# Patient Record
Sex: MALE | Race: WHITE | NOT HISPANIC OR LATINO | URBAN - METROPOLITAN AREA
[De-identification: names, ages, dates, MRNs, and addresses within clinical notes are randomized per-mention and may not be internally consistent; named-entity substitution may affect disease eponyms.]

---

## 2023-08-15 LAB
ALANINE AMINOTRANSFERASE (SGPT) (U/L) IN SER/PLAS: 18 U/L (ref 10–52)
ALBUMIN (G/DL) IN SER/PLAS: 4.3 G/DL (ref 3.4–5)
ALKALINE PHOSPHATASE (U/L) IN SER/PLAS: 37 U/L (ref 33–136)
ANION GAP IN SER/PLAS: 12 MMOL/L (ref 10–20)
ASPARTATE AMINOTRANSFERASE (SGOT) (U/L) IN SER/PLAS: 18 U/L (ref 9–39)
BASOPHILS (10*3/UL) IN BLOOD BY AUTOMATED COUNT: 0.07 X10E9/L (ref 0–0.1)
BASOPHILS/100 LEUKOCYTES IN BLOOD BY AUTOMATED COUNT: 1.2 % (ref 0–2)
BILIRUBIN TOTAL (MG/DL) IN SER/PLAS: 0.3 MG/DL (ref 0–1.2)
CALCIDIOL (25 OH VITAMIN D3) (NG/ML) IN SER/PLAS: 39 NG/ML
CALCIUM (MG/DL) IN SER/PLAS: 9.2 MG/DL (ref 8.6–10.3)
CARBON DIOXIDE, TOTAL (MMOL/L) IN SER/PLAS: 28 MMOL/L (ref 21–32)
CHLORIDE (MMOL/L) IN SER/PLAS: 99 MMOL/L (ref 98–107)
CHOLESTEROL (MG/DL) IN SER/PLAS: 148 MG/DL (ref 0–199)
CHOLESTEROL IN HDL (MG/DL) IN SER/PLAS: 34.8 MG/DL
CHOLESTEROL/HDL RATIO: 4.3
CREATININE (MG/DL) IN SER/PLAS: 0.79 MG/DL (ref 0.5–1.3)
EOSINOPHILS (10*3/UL) IN BLOOD BY AUTOMATED COUNT: 0.11 X10E9/L (ref 0–0.7)
EOSINOPHILS/100 LEUKOCYTES IN BLOOD BY AUTOMATED COUNT: 1.9 % (ref 0–6)
ERYTHROCYTE DISTRIBUTION WIDTH (RATIO) BY AUTOMATED COUNT: 12.8 % (ref 11.5–14.5)
ERYTHROCYTE MEAN CORPUSCULAR HEMOGLOBIN CONCENTRATION (G/DL) BY AUTOMATED: 33.3 G/DL (ref 32–36)
ERYTHROCYTE MEAN CORPUSCULAR VOLUME (FL) BY AUTOMATED COUNT: 90 FL (ref 80–100)
ERYTHROCYTES (10*6/UL) IN BLOOD BY AUTOMATED COUNT: 5.17 X10E12/L (ref 4.5–5.9)
ESTIMATED AVERAGE GLUCOSE FOR HBA1C: 120 MG/DL
GFR MALE: >90 ML/MIN/1.73M2
GLUCOSE (MG/DL) IN SER/PLAS: 89 MG/DL (ref 74–99)
HEMATOCRIT (%) IN BLOOD BY AUTOMATED COUNT: 46.5 % (ref 41–52)
HEMOGLOBIN (G/DL) IN BLOOD: 15.5 G/DL (ref 13.5–17.5)
HEMOGLOBIN A1C/HEMOGLOBIN TOTAL IN BLOOD: 5.8 %
IMMATURE GRANULOCYTES/100 LEUKOCYTES IN BLOOD BY AUTOMATED COUNT: 0.2 % (ref 0–0.9)
LDL: 102 MG/DL (ref 0–99)
LEUKOCYTES (10*3/UL) IN BLOOD BY AUTOMATED COUNT: 5.9 X10E9/L (ref 4.4–11.3)
LYMPHOCYTES (10*3/UL) IN BLOOD BY AUTOMATED COUNT: 1.92 X10E9/L (ref 1.2–4.8)
LYMPHOCYTES/100 LEUKOCYTES IN BLOOD BY AUTOMATED COUNT: 32.3 % (ref 13–44)
MONOCYTES (10*3/UL) IN BLOOD BY AUTOMATED COUNT: 0.73 X10E9/L (ref 0.1–1)
MONOCYTES/100 LEUKOCYTES IN BLOOD BY AUTOMATED COUNT: 12.3 % (ref 2–10)
NEUTROPHILS (10*3/UL) IN BLOOD BY AUTOMATED COUNT: 3.1 X10E9/L (ref 1.2–7.7)
NEUTROPHILS/100 LEUKOCYTES IN BLOOD BY AUTOMATED COUNT: 52.1 % (ref 40–80)
PLATELETS (10*3/UL) IN BLOOD AUTOMATED COUNT: 217 X10E9/L (ref 150–450)
POTASSIUM (MMOL/L) IN SER/PLAS: 3.9 MMOL/L (ref 3.5–5.3)
PROSTATE SPECIFIC ANTIGEN,SCREEN: 0.95 NG/ML (ref 0–4)
PROTEIN TOTAL: 6.8 G/DL (ref 6.4–8.2)
SODIUM (MMOL/L) IN SER/PLAS: 135 MMOL/L (ref 136–145)
THYROTROPIN (MIU/L) IN SER/PLAS BY DETECTION LIMIT <= 0.05 MIU/L: 1.53 MIU/L (ref 0.44–3.98)
TRIGLYCERIDE (MG/DL) IN SER/PLAS: 54 MG/DL (ref 0–149)
UREA NITROGEN (MG/DL) IN SER/PLAS: 23 MG/DL (ref 6–23)
VLDL: 11 MG/DL (ref 0–40)

## 2024-02-28 ENCOUNTER — LAB (OUTPATIENT)
Dept: LAB | Facility: LAB | Age: 69
End: 2024-02-28
Payer: MEDICARE

## 2024-02-28 DIAGNOSIS — I10 ESSENTIAL (PRIMARY) HYPERTENSION: Primary | ICD-10-CM

## 2024-02-28 DIAGNOSIS — E55.9 VITAMIN D DEFICIENCY, UNSPECIFIED: ICD-10-CM

## 2024-02-28 DIAGNOSIS — R73.03 PREDIABETES: ICD-10-CM

## 2024-02-28 DIAGNOSIS — E78.5 HYPERLIPIDEMIA, UNSPECIFIED: ICD-10-CM

## 2024-02-28 LAB
25(OH)D3 SERPL-MCNC: 36 NG/ML (ref 30–100)
ALBUMIN SERPL BCP-MCNC: 4.5 G/DL (ref 3.4–5)
ALP SERPL-CCNC: 45 U/L (ref 33–136)
ALT SERPL W P-5'-P-CCNC: 18 U/L (ref 10–52)
ANION GAP SERPL CALC-SCNC: 11 MMOL/L (ref 10–20)
AST SERPL W P-5'-P-CCNC: 19 U/L (ref 9–39)
BASOPHILS # BLD AUTO: 0.06 X10*3/UL (ref 0–0.1)
BASOPHILS NFR BLD AUTO: 1.3 %
BILIRUB SERPL-MCNC: 0.5 MG/DL (ref 0–1.2)
BUN SERPL-MCNC: 16 MG/DL (ref 6–23)
CALCIUM SERPL-MCNC: 9.7 MG/DL (ref 8.6–10.3)
CHLORIDE SERPL-SCNC: 100 MMOL/L (ref 98–107)
CHOLEST SERPL-MCNC: 157 MG/DL (ref 0–199)
CHOLESTEROL/HDL RATIO: 4
CO2 SERPL-SCNC: 29 MMOL/L (ref 21–32)
CREAT SERPL-MCNC: 0.75 MG/DL (ref 0.5–1.3)
EGFRCR SERPLBLD CKD-EPI 2021: >90 ML/MIN/1.73M*2
EOSINOPHIL # BLD AUTO: 0.11 X10*3/UL (ref 0–0.7)
EOSINOPHIL NFR BLD AUTO: 2.3 %
ERYTHROCYTE [DISTWIDTH] IN BLOOD BY AUTOMATED COUNT: 12.7 % (ref 11.5–14.5)
EST. AVERAGE GLUCOSE BLD GHB EST-MCNC: 114 MG/DL
GLUCOSE SERPL-MCNC: 98 MG/DL (ref 74–99)
HBA1C MFR BLD: 5.6 %
HCT VFR BLD AUTO: 48.1 % (ref 41–52)
HDLC SERPL-MCNC: 39.3 MG/DL
HGB BLD-MCNC: 16.3 G/DL (ref 13.5–17.5)
IMM GRANULOCYTES # BLD AUTO: 0.01 X10*3/UL (ref 0–0.7)
IMM GRANULOCYTES NFR BLD AUTO: 0.2 % (ref 0–0.9)
LDLC SERPL CALC-MCNC: 102 MG/DL
LYMPHOCYTES # BLD AUTO: 1.75 X10*3/UL (ref 1.2–4.8)
LYMPHOCYTES NFR BLD AUTO: 37.2 %
MCH RBC QN AUTO: 30.2 PG (ref 26–34)
MCHC RBC AUTO-ENTMCNC: 33.9 G/DL (ref 32–36)
MCV RBC AUTO: 89 FL (ref 80–100)
MONOCYTES # BLD AUTO: 0.46 X10*3/UL (ref 0.1–1)
MONOCYTES NFR BLD AUTO: 9.8 %
NEUTROPHILS # BLD AUTO: 2.31 X10*3/UL (ref 1.2–7.7)
NEUTROPHILS NFR BLD AUTO: 49.2 %
NON HDL CHOLESTEROL: 118 MG/DL (ref 0–149)
NRBC BLD-RTO: 0 /100 WBCS (ref 0–0)
PLATELET # BLD AUTO: 231 X10*3/UL (ref 150–450)
POTASSIUM SERPL-SCNC: 4.9 MMOL/L (ref 3.5–5.3)
PROT SERPL-MCNC: 6.9 G/DL (ref 6.4–8.2)
RBC # BLD AUTO: 5.4 X10*6/UL (ref 4.5–5.9)
SODIUM SERPL-SCNC: 135 MMOL/L (ref 136–145)
TRIGL SERPL-MCNC: 81 MG/DL (ref 0–149)
TSH SERPL-ACNC: 1.24 MIU/L (ref 0.44–3.98)
VLDL: 16 MG/DL (ref 0–40)
WBC # BLD AUTO: 4.7 X10*3/UL (ref 4.4–11.3)

## 2024-02-28 PROCEDURE — 80061 LIPID PANEL: CPT

## 2024-02-28 PROCEDURE — 84443 ASSAY THYROID STIM HORMONE: CPT

## 2024-02-28 PROCEDURE — 83036 HEMOGLOBIN GLYCOSYLATED A1C: CPT

## 2024-02-28 PROCEDURE — 85025 COMPLETE CBC W/AUTO DIFF WBC: CPT

## 2024-02-28 PROCEDURE — 80053 COMPREHEN METABOLIC PANEL: CPT

## 2024-02-28 PROCEDURE — 82306 VITAMIN D 25 HYDROXY: CPT

## 2024-02-28 PROCEDURE — 36415 COLL VENOUS BLD VENIPUNCTURE: CPT

## 2024-07-30 ENCOUNTER — HOSPITAL ENCOUNTER (OUTPATIENT)
Dept: RADIOLOGY | Facility: HOSPITAL | Age: 69
Discharge: HOME | End: 2024-07-30
Payer: MEDICARE

## 2024-07-30 ENCOUNTER — APPOINTMENT (OUTPATIENT)
Dept: ORTHOPEDIC SURGERY | Facility: CLINIC | Age: 69
End: 2024-07-30
Payer: MEDICARE

## 2024-07-30 VITALS — WEIGHT: 231 LBS | BODY MASS INDEX: 31.29 KG/M2 | HEIGHT: 72 IN

## 2024-07-30 DIAGNOSIS — M25.562 ACUTE BILATERAL KNEE PAIN: ICD-10-CM

## 2024-07-30 DIAGNOSIS — M25.562 ACUTE BILATERAL KNEE PAIN: Primary | ICD-10-CM

## 2024-07-30 DIAGNOSIS — M25.561 ACUTE BILATERAL KNEE PAIN: Primary | ICD-10-CM

## 2024-07-30 DIAGNOSIS — M25.561 ACUTE BILATERAL KNEE PAIN: ICD-10-CM

## 2024-07-30 PROCEDURE — 1159F MED LIST DOCD IN RCRD: CPT | Performed by: ORTHOPAEDIC SURGERY

## 2024-07-30 PROCEDURE — 73560 X-RAY EXAM OF KNEE 1 OR 2: CPT | Mod: LT

## 2024-07-30 PROCEDURE — 1125F AMNT PAIN NOTED PAIN PRSNT: CPT | Performed by: ORTHOPAEDIC SURGERY

## 2024-07-30 PROCEDURE — 1160F RVW MEDS BY RX/DR IN RCRD: CPT | Performed by: ORTHOPAEDIC SURGERY

## 2024-07-30 PROCEDURE — 99205 OFFICE O/P NEW HI 60 MIN: CPT | Performed by: ORTHOPAEDIC SURGERY

## 2024-07-30 PROCEDURE — 73562 X-RAY EXAM OF KNEE 3: CPT | Mod: RT

## 2024-07-30 PROCEDURE — 3008F BODY MASS INDEX DOCD: CPT | Performed by: ORTHOPAEDIC SURGERY

## 2024-07-30 RX ORDER — MELOXICAM 15 MG/1
15 TABLET ORAL DAILY
Qty: 30 TABLET | Refills: 3 | Status: SHIPPED | OUTPATIENT
Start: 2024-07-30 | End: 2024-11-27

## 2024-07-30 RX ORDER — CEFAZOLIN SODIUM 2 G/100ML
2 INJECTION, SOLUTION INTRAVENOUS ONCE
OUTPATIENT
Start: 2024-07-30 | End: 2024-07-30

## 2024-07-30 RX ORDER — OLMESARTAN MEDOXOMIL 20 MG/1
20 TABLET ORAL DAILY
COMMUNITY

## 2024-07-30 RX ORDER — ACETAMINOPHEN 325 MG/1
975 TABLET ORAL ONCE
OUTPATIENT
Start: 2024-07-30 | End: 2024-07-30

## 2024-07-30 RX ORDER — MELOXICAM 15 MG/1
15 TABLET ORAL DAILY
COMMUNITY
End: 2024-07-30 | Stop reason: SDUPTHER

## 2024-07-30 RX ORDER — ATORVASTATIN CALCIUM 20 MG/1
20 TABLET, FILM COATED ORAL DAILY
COMMUNITY

## 2024-07-30 RX ORDER — SODIUM CHLORIDE 9 MG/ML
100 INJECTION, SOLUTION INTRAVENOUS CONTINUOUS
OUTPATIENT
Start: 2024-07-30

## 2024-07-30 RX ORDER — CELECOXIB 400 MG/1
400 CAPSULE ORAL ONCE
OUTPATIENT
Start: 2024-07-30 | End: 2024-07-30

## 2024-07-30 ASSESSMENT — PAIN SCALES - GENERAL: PAINLEVEL_OUTOF10: 6

## 2024-07-30 ASSESSMENT — PAIN - FUNCTIONAL ASSESSMENT: PAIN_FUNCTIONAL_ASSESSMENT: 0-10

## 2024-07-30 NOTE — PROGRESS NOTES
This is a consultation from Dr. Jena Mireles, APRN-CNP for   Chief Complaint   Patient presents with    Left Knee - Pain    Right Knee - Pain       This is a 68 y.o. male who presents for left knee pain.  Patient states has had left knee pain for many years, this is a chronic longstanding issue.  He has had 2 arthroscopic surgeries on it.  His pains been getting worse over time.  He had gel injections in the past which have been helpful but have worn off.  He has been severely exacerbated last few weeks, sharp pain over the medial knee worse with walking proving with rest.  Getting much worse and making it more difficult for him to get around.  He had extensive trial of nonsurgical management including use of anti-inflammatories physical therapy activity modification use of assistive devices as well as injections.  Despite that he has severe and worsening pain which impacts his quality of life and activities of daily living like to consider total knee    Physical Exam    There has been no interval change in this patient's past medical, surgical, medications, allergies, family history or social history since the most recent visit to a provider within our department. 14 point review of systems was performed, reviewed, and negative except for pertinent positives documented in the history of present illness.     Constitutional: well developed, well nourished male in no acute distress  Psychiatric: normal mood, appropriate affect  Eyes: sclera anicteric  HENT: normocephalic/atraumatic  CV: regular rate and rhythm   Respiratory: non labored breathing  Integumentary: no rash  Neurological: moves all extremities    Left knee exam: skin intact no lacerations or abrations.  1+ effusion.  Tender medial joint line. negative log roll negative patellar grind. ROM 5-100. stable to varus and valgus stress at 0 and 30 degrees. negative lachman negative posterior drawer negative monisha. 5/5 ehl/fhl/gs/ta. silt s/s/sp/dp/t. 2+  dp/pt        Xrays were ordered by me, they were reviewed and independently interpreted by me today, they show severe degenerative disease bone-on-bone arthritis left knee subchondral sclerosis osteophyte formation Kellgren-Antoine grade 4    Procedures      Impression/Plan: This is a 68 y.o. male with severe left knee arthritis that has failed nonoperative management.  Patient elected proceed with left total knee.    I had an extensive discussion with the patient regarding her condition and possible treatment options. Nonsurgical treatment for left knee arthritis includes activity modification, weight loss, use of a cane or other assistive device, pain medications and nonsteroidal anti-inflammatory medications, joint injections, and physical therapy. The patient has attempted non-surgical treatment for this condition for greater than 6 months and it has failed. We discussed the risks benefits and alternatives of total knee arthroplasty. Benefits of joint replacement include: Relief of pain, improvement of function, and improved quality of life. Alternatives include observation and watchful waiting, and the nonsurgical modalities noted above.     Discussed with the patient that during total knee arthroplasty prosthetic resurfacing of the patella may or may not be performed at the discretion of thesurgeon during surgery. In the event that prosthetic patellar resurfacing is not performed, nonprosthetic arthroplasty will be performed with removal of bone spurs, circumferential synovectomy and electrocautery. Patient was informed that anterior knee pain and patellofemoral crepitus can potentially occur after knee surgery with or without prosthetic patellar resurfacing.         We discussed the risks of complications as well as the risks of morbidity and mortality related to surgical treatment with total joint replacement. We reviewed the fact that total joint replacement is major elective surgery with significant  associated surgical and procedural risk factors as detailed below.   Risks include: Pain, blood loss, damage to nearby anatomical structures including but not limited to nerves or blood vessels muscles tendons and bone, failure surgery to ameliorate symptoms, persistence of pain surrounding the affected joint, mechanical failure of the prosthesis including but not limited to loosening of the prosthesis from bone ,breakage of the prosthesis and dislocation of the prosthesis, change in length or appearance of a limb, infection possibly necessitating further surgery, removal of the prosthesis, or limb amputation, the need for additional surgery for other reasons, blood clots, amputation, and death. No guarantees were implied or given.  All questions were answered and the patient voiced their understanding.     A complete set of surgical instructions was given to the patient. The patient was educated regarding preoperative nutrition, preparation of their home for postoperative rehabilitation, choosing a care partner, medical and dental clearance, the cessation of medications that can cause bleeding or presenting to risk for infection, chlorhexidine bath, nasal swab and decontamination, post operative follow up, and need for medical equipment. Patient was also educated regarding the possibility of same day surgery and related criteria and protocols. The patient will attend our joint class further education, and thereafter they will return for a preoperative visit. A presurgery education booklet was also given to the patient.     surgical plan: Left total knee  implants: DePuy  approach: Standard  DVT ppx aspirin  drugs to stop none  allergy to abx none  post operative abx: ancef +/- vanc (per protocol)  special clearance needed none    BMI Readings from Last 1 Encounters:   07/30/24 31.33 kg/m²      Lab Results   Component Value Date    CREATININE 0.75 02/28/2024     Tobacco Use: High Risk (7/30/2024)    Patient History  "    Smoking Tobacco Use: Every Day     Smokeless Tobacco Use: Unknown     Passive Exposure: Not on file      Computed MELD 3.0 unavailable. One or more values for this score either were not found within the given timeframe or did not fit some other criterion.  Computed MELD-Na unavailable. One or more values for this score either were not found within the given timeframe or did not fit some other criterion.       Lab Results   Component Value Date    HGBA1C 5.6 02/28/2024     No results found for: \"STAPHMRSASCR\"  "

## 2024-07-30 NOTE — LETTER
July 30, 2024     Jena Mireles, JOSEFA-CNP  [address]  [address]  [facility] [address]    Patient: Marcio Bower   YOB: 1955   Date of Visit: 7/30/2024       Dear Dr. Jena Mireles, APRN-CNP:    Thank you for referring Marcio Bower to me for evaluation. Below are my notes for this consultation.  If you have questions, please do not hesitate to call me. I look forward to following your patient along with you.       Sincerely,     Desmond Winter MD      CC: No Recipients  ______________________________________________________________________________________    This is a consultation from JOSEFA Sanchez-CNP for   Chief Complaint   Patient presents with   • Left Knee - Pain   • Right Knee - Pain       This is a 68 y.o. male who presents for left knee pain.  Patient states has had left knee pain for many years, this is a chronic longstanding issue.  He has had 2 arthroscopic surgeries on it.  His pains been getting worse over time.  He had gel injections in the past which have been helpful but have worn off.  He has been severely exacerbated last few weeks, sharp pain over the medial knee worse with walking proving with rest.  Getting much worse and making it more difficult for him to get around.  He had extensive trial of nonsurgical management including use of anti-inflammatories physical therapy activity modification use of assistive devices as well as injections.  Despite that he has severe and worsening pain which impacts his quality of life and activities of daily living like to consider total knee    Physical Exam    There has been no interval change in this patient's past medical, surgical, medications, allergies, family history or social history since the most recent visit to a provider within our department. 14 point review of systems was performed, reviewed, and negative except for pertinent positives documented in the history of present illness.     Constitutional:  well developed, well nourished male in no acute distress  Psychiatric: normal mood, appropriate affect  Eyes: sclera anicteric  HENT: normocephalic/atraumatic  CV: regular rate and rhythm   Respiratory: non labored breathing  Integumentary: no rash  Neurological: moves all extremities    Left knee exam: skin intact no lacerations or abrations.  1+ effusion.  Tender medial joint line. negative log roll negative patellar grind. ROM 5-100. stable to varus and valgus stress at 0 and 30 degrees. negative lachman negative posterior drawer negative monisha. 5/5 ehl/fhl/gs/ta. silt s/s/sp/dp/t. 2+ dp/pt        Xrays were ordered by me, they were reviewed and independently interpreted by me today, they show severe degenerative disease bone-on-bone arthritis left knee subchondral sclerosis osteophyte formation Kellgren-Antoine grade 4    Procedures      Impression/Plan: This is a 68 y.o. male with severe left knee arthritis that has failed nonoperative management.  Patient elected proceed with left total knee.    I had an extensive discussion with the patient regarding her condition and possible treatment options. Nonsurgical treatment for left knee arthritis includes activity modification, weight loss, use of a cane or other assistive device, pain medications and nonsteroidal anti-inflammatory medications, joint injections, and physical therapy. The patient has attempted non-surgical treatment for this condition for greater than 6 months and it has failed. We discussed the risks benefits and alternatives of total knee arthroplasty. Benefits of joint replacement include: Relief of pain, improvement of function, and improved quality of life. Alternatives include observation and watchful waiting, and the nonsurgical modalities noted above.     Discussed with the patient that during total knee arthroplasty prosthetic resurfacing of the patella may or may not be performed at the discretion of thesurgeon during surgery. In the  event that prosthetic patellar resurfacing is not performed, nonprosthetic arthroplasty will be performed with removal of bone spurs, circumferential synovectomy and electrocautery. Patient was informed that anterior knee pain and patellofemoral crepitus can potentially occur after knee surgery with or without prosthetic patellar resurfacing.         We discussed the risks of complications as well as the risks of morbidity and mortality related to surgical treatment with total joint replacement. We reviewed the fact that total joint replacement is major elective surgery with significant associated surgical and procedural risk factors as detailed below.   Risks include: Pain, blood loss, damage to nearby anatomical structures including but not limited to nerves or blood vessels muscles tendons and bone, failure surgery to ameliorate symptoms, persistence of pain surrounding the affected joint, mechanical failure of the prosthesis including but not limited to loosening of the prosthesis from bone ,breakage of the prosthesis and dislocation of the prosthesis, change in length or appearance of a limb, infection possibly necessitating further surgery, removal of the prosthesis, or limb amputation, the need for additional surgery for other reasons, blood clots, amputation, and death. No guarantees were implied or given.  All questions were answered and the patient voiced their understanding.     A complete set of surgical instructions was given to the patient. The patient was educated regarding preoperative nutrition, preparation of their home for postoperative rehabilitation, choosing a care partner, medical and dental clearance, the cessation of medications that can cause bleeding or presenting to risk for infection, chlorhexidine bath, nasal swab and decontamination, post operative follow up, and need for medical equipment. Patient was also educated regarding the possibility of same day surgery and related criteria and  "protocols. The patient will attend our joint class further education, and thereafter they will return for a preoperative visit. A presurgery education booklet was also given to the patient.     surgical plan: Left total knee  implants: DePuy  approach: Standard  DVT ppx aspirin  drugs to stop none  allergy to abx none  post operative abx: ancef +/- vanc (per protocol)  special clearance needed none    BMI Readings from Last 1 Encounters:   07/30/24 31.33 kg/m²      Lab Results   Component Value Date    CREATININE 0.75 02/28/2024     Tobacco Use: High Risk (7/30/2024)    Patient History    • Smoking Tobacco Use: Every Day    • Smokeless Tobacco Use: Unknown    • Passive Exposure: Not on file      Computed MELD 3.0 unavailable. One or more values for this score either were not found within the given timeframe or did not fit some other criterion.  Computed MELD-Na unavailable. One or more values for this score either were not found within the given timeframe or did not fit some other criterion.       Lab Results   Component Value Date    HGBA1C 5.6 02/28/2024     No results found for: \"STAPHMRSASCR\"  "

## 2024-09-05 ENCOUNTER — APPOINTMENT (OUTPATIENT)
Dept: ORTHOPEDIC SURGERY | Facility: CLINIC | Age: 69
End: 2024-09-05
Payer: MEDICARE

## 2024-09-16 ENCOUNTER — PRE-ADMISSION TESTING (OUTPATIENT)
Dept: PREADMISSION TESTING | Facility: HOSPITAL | Age: 69
End: 2024-09-16
Payer: MEDICARE

## 2024-09-16 ENCOUNTER — HOSPITAL ENCOUNTER (OUTPATIENT)
Dept: RADIOLOGY | Facility: HOSPITAL | Age: 69
Discharge: HOME | End: 2024-09-16
Payer: MEDICARE

## 2024-09-16 VITALS
BODY MASS INDEX: 30.3 KG/M2 | SYSTOLIC BLOOD PRESSURE: 134 MMHG | TEMPERATURE: 97.3 F | OXYGEN SATURATION: 97 % | RESPIRATION RATE: 18 BRPM | DIASTOLIC BLOOD PRESSURE: 85 MMHG | HEIGHT: 73 IN | WEIGHT: 228.62 LBS | HEART RATE: 91 BPM

## 2024-09-16 DIAGNOSIS — Z01.818 PREOPERATIVE EXAMINATION: Primary | ICD-10-CM

## 2024-09-16 DIAGNOSIS — M25.562 ACUTE BILATERAL KNEE PAIN: ICD-10-CM

## 2024-09-16 DIAGNOSIS — M25.561 ACUTE BILATERAL KNEE PAIN: ICD-10-CM

## 2024-09-16 LAB
ALBUMIN SERPL BCP-MCNC: 4.5 G/DL (ref 3.4–5)
ALP SERPL-CCNC: 46 U/L (ref 33–136)
ALT SERPL W P-5'-P-CCNC: 16 U/L (ref 10–52)
ANION GAP SERPL CALC-SCNC: 11 MMOL/L (ref 10–20)
AST SERPL W P-5'-P-CCNC: 18 U/L (ref 9–39)
ATRIAL RATE: 84 BPM
BASOPHILS # BLD AUTO: 0.05 X10*3/UL (ref 0–0.1)
BASOPHILS NFR BLD AUTO: 0.9 %
BILIRUB SERPL-MCNC: 0.6 MG/DL (ref 0–1.2)
BUN SERPL-MCNC: 16 MG/DL (ref 6–23)
CALCIUM SERPL-MCNC: 9.6 MG/DL (ref 8.6–10.3)
CHLORIDE SERPL-SCNC: 97 MMOL/L (ref 98–107)
CO2 SERPL-SCNC: 29 MMOL/L (ref 21–32)
CREAT SERPL-MCNC: 0.71 MG/DL (ref 0.5–1.3)
EGFRCR SERPLBLD CKD-EPI 2021: >90 ML/MIN/1.73M*2
EOSINOPHIL # BLD AUTO: 0.08 X10*3/UL (ref 0–0.7)
EOSINOPHIL NFR BLD AUTO: 1.5 %
ERYTHROCYTE [DISTWIDTH] IN BLOOD BY AUTOMATED COUNT: 12.2 % (ref 11.5–14.5)
GLUCOSE SERPL-MCNC: 97 MG/DL (ref 74–99)
HCT VFR BLD AUTO: 47.3 % (ref 41–52)
HGB BLD-MCNC: 15.9 G/DL (ref 13.5–17.5)
IMM GRANULOCYTES # BLD AUTO: 0.02 X10*3/UL (ref 0–0.7)
IMM GRANULOCYTES NFR BLD AUTO: 0.4 % (ref 0–0.9)
LYMPHOCYTES # BLD AUTO: 1.53 X10*3/UL (ref 1.2–4.8)
LYMPHOCYTES NFR BLD AUTO: 28.9 %
MCH RBC QN AUTO: 29.6 PG (ref 26–34)
MCHC RBC AUTO-ENTMCNC: 33.6 G/DL (ref 32–36)
MCV RBC AUTO: 88 FL (ref 80–100)
MONOCYTES # BLD AUTO: 0.55 X10*3/UL (ref 0.1–1)
MONOCYTES NFR BLD AUTO: 10.4 %
NEUTROPHILS # BLD AUTO: 3.06 X10*3/UL (ref 1.2–7.7)
NEUTROPHILS NFR BLD AUTO: 57.9 %
NRBC BLD-RTO: 0 /100 WBCS (ref 0–0)
P AXIS: 24 DEGREES
P OFFSET: 194 MS
P ONSET: 133 MS
PLATELET # BLD AUTO: 233 X10*3/UL (ref 150–450)
POTASSIUM SERPL-SCNC: 4.3 MMOL/L (ref 3.5–5.3)
PR INTERVAL: 180 MS
PROT SERPL-MCNC: 7 G/DL (ref 6.4–8.2)
Q ONSET: 223 MS
QRS COUNT: 14 BEATS
QRS DURATION: 108 MS
QT INTERVAL: 376 MS
QTC CALCULATION(BAZETT): 444 MS
QTC FREDERICIA: 420 MS
R AXIS: -20 DEGREES
RBC # BLD AUTO: 5.38 X10*6/UL (ref 4.5–5.9)
SODIUM SERPL-SCNC: 133 MMOL/L (ref 136–145)
T AXIS: 31 DEGREES
T OFFSET: 411 MS
VENTRICULAR RATE: 84 BPM
WBC # BLD AUTO: 5.3 X10*3/UL (ref 4.4–11.3)

## 2024-09-16 PROCEDURE — 77073 BONE LENGTH STUDIES: CPT

## 2024-09-16 PROCEDURE — 85025 COMPLETE CBC W/AUTO DIFF WBC: CPT

## 2024-09-16 PROCEDURE — 87081 CULTURE SCREEN ONLY: CPT | Mod: GEALAB

## 2024-09-16 PROCEDURE — 93005 ELECTROCARDIOGRAM TRACING: CPT

## 2024-09-16 PROCEDURE — 80053 COMPREHEN METABOLIC PANEL: CPT

## 2024-09-16 PROCEDURE — 99204 OFFICE O/P NEW MOD 45 MIN: CPT | Performed by: NURSE PRACTITIONER

## 2024-09-16 PROCEDURE — 36415 COLL VENOUS BLD VENIPUNCTURE: CPT

## 2024-09-16 RX ORDER — IBUPROFEN 200 MG
400 TABLET ORAL EVERY 8 HOURS PRN
COMMUNITY

## 2024-09-16 RX ORDER — VIT C/E/ZN/COPPR/LUTEIN/ZEAXAN 250MG-90MG
25 CAPSULE ORAL DAILY
COMMUNITY

## 2024-09-16 RX ORDER — CHLORHEXIDINE GLUCONATE 40 MG/ML
1 SOLUTION TOPICAL DAILY
Start: 2024-09-16 | End: 2024-09-21

## 2024-09-16 RX ORDER — CHLORHEXIDINE GLUCONATE ORAL RINSE 1.2 MG/ML
15 SOLUTION DENTAL DAILY
Qty: 30 ML | Refills: 0 | Status: SHIPPED | OUTPATIENT
Start: 2024-09-16 | End: 2024-09-18

## 2024-09-16 RX ORDER — ASPIRIN 81 MG/1
81 TABLET ORAL DAILY
COMMUNITY

## 2024-09-16 ASSESSMENT — DUKE ACTIVITY SCORE INDEX (DASI)
CAN YOU WALK INDOORS, SUCH AS AROUND YOUR HOUSE: YES
CAN YOU CLIMB A FLIGHT OF STAIRS OR WALK UP A HILL: YES
CAN YOU DO MODERATE WORK AROUND THE HOUSE LIKE VACUUMING, SWEEPING FLOORS OR CARRYING GROCERIES: YES
CAN YOU TAKE CARE OF YOURSELF (EAT, DRESS, BATHE, OR USE TOILET): YES
CAN YOU DO YARD WORK LIKE RAKING LEAVES, WEEDING OR PUSHING A MOWER: YES
CAN YOU WALK A BLOCK OR TWO ON LEVEL GROUND: YES
CAN YOU PARTICIPATE IN MODERATE RECREATIONAL ACTIVITIES LIKE GOLF, BOWLING, DANCING, DOUBLES TENNIS OR THROWING A BASEBALL OR FOOTBALL: NO
CAN YOU DO LIGHT WORK AROUND THE HOUSE LIKE DUSTING OR WASHING DISHES: YES
CAN YOU PARTICIPATE IN STRENOUS SPORTS LIKE SWIMMING, SINGLES TENNIS, FOOTBALL, BASKETBALL, OR SKIING: NO
DASI METS SCORE: 6.3
TOTAL_SCORE: 28.7
CAN YOU RUN A SHORT DISTANCE: NO
CAN YOU DO HEAVY WORK AROUND THE HOUSE LIKE SCRUBBING FLOORS OR LIFTING AND MOVING HEAVY FURNITURE: NO
CAN YOU HAVE SEXUAL RELATIONS: YES

## 2024-09-16 ASSESSMENT — ENCOUNTER SYMPTOMS
CARDIOVASCULAR NEGATIVE: 1
EYES NEGATIVE: 1
NEUROLOGICAL NEGATIVE: 1
RESPIRATORY NEGATIVE: 1
CONSTITUTIONAL NEGATIVE: 1
GASTROINTESTINAL NEGATIVE: 1
NECK NEGATIVE: 1
MUSCULOSKELETAL NEGATIVE: 1

## 2024-09-16 ASSESSMENT — PAIN SCALES - GENERAL: PAINLEVEL_OUTOF10: 2

## 2024-09-16 ASSESSMENT — LIFESTYLE VARIABLES: SMOKING_STATUS: SMOKER

## 2024-09-16 ASSESSMENT — PAIN - FUNCTIONAL ASSESSMENT: PAIN_FUNCTIONAL_ASSESSMENT: 0-10

## 2024-09-16 ASSESSMENT — ACTIVITIES OF DAILY LIVING (ADL): ADL_SCORE: 0

## 2024-09-16 NOTE — CPM/PAT H&P
CPM/PAT Evaluation       Name: Marcio Bower (Marcio Bower)  /Age: 1955/68 y.o.     Visit Type:   In-Person       Chief Complaint: BL knee pain    HPI 69 y/o male scheduled for total knee replacement on 10/7/2024 with  Dr. Winter secondary to knee pain.  PMHX includes knee pain, HTN.  PAT is consulted today for perioperative risk stratification and optimization.      Past Medical History:   Diagnosis Date    Arthritis     Cellulitis     bilateral upper extremities    Hyperlipidemia     Hypertension     Wears dentures     full upper denture   having 7 lower teeth extracted on , then will be edentulous       Past Surgical History:   Procedure Laterality Date    KNEE ARTHROSCOPY W/ MENISCAL REPAIR Left     X2    RETINAL DETACHMENT SURGERY         Patient  has no history on file for sexual activity.    Family History   Problem Relation Name Age of Onset    Hypertension Mother      Breast cancer Mother      Coronary artery disease Father      Lung cancer Sister      Acute myelogenous leukemia Brother         No Known Allergies    Prior to Admission medications    Medication Sig Start Date End Date Taking? Authorizing Provider   atorvastatin (Lipitor) 20 mg tablet Take 1 tablet (20 mg) by mouth once daily.    Historical Provider, MD   chlorhexidine (Hibiclens) 4 % external liquid Apply 1 Application topically once daily for 5 days. Use per CPM/PAT provided instructions 24  TAYE Ferguson   chlorhexidine (Peridex) 0.12 % solution Use 15 mL in the mouth or throat once daily for 2 doses. 24  TAYE Ferguson   meloxicam (Mobic) 15 mg tablet Take 1 tablet (15 mg) by mouth once daily. 24  Desmond Winter MD   olmesartan (BENIcar) 20 mg tablet Take 1 tablet (20 mg) by mouth once daily.    Historical Provider, MD PARKS ROS:   Constitutional:   neg    Neuro/Psych:   neg    Eyes:   neg    Ears:   neg    Nose:   Mouth:   Throat:   neg    Neck:    neg    Cardio:   neg    Respiratory:   neg    Endocrine:   GI:   neg    :   neg    Musculoskeletal:   neg    Hematologic:   neg    Skin:      Physical Exam  Vitals reviewed.   Constitutional:       Appearance: Normal appearance.   HENT:      Mouth/Throat:      Mouth: Mucous membranes are moist.      Pharynx: Oropharynx is clear.   Eyes:      Pupils: Pupils are equal, round, and reactive to light.   Cardiovascular:      Rate and Rhythm: Normal rate and regular rhythm.      Pulses: Normal pulses.      Heart sounds: Normal heart sounds.   Pulmonary:      Effort: Pulmonary effort is normal.      Breath sounds: Normal breath sounds.   Abdominal:      General: Bowel sounds are normal.      Palpations: Abdomen is soft.   Musculoskeletal:         General: Normal range of motion.      Cervical back: Normal range of motion and neck supple.   Skin:     General: Skin is warm and dry.   Neurological:      General: No focal deficit present.      Mental Status: He is alert and oriented to person, place, and time.   Psychiatric:         Mood and Affect: Mood normal.         Behavior: Behavior normal.          PAT AIRWAY:   Airway:     Mallampati::  II    Neck ROM::  Full   No teeth      Visit Vitals  /85   Pulse 91   Temp 36.3 °C (97.3 °F)   Resp 18       DASI Risk Score      Flowsheet Row Most Recent Value   DASI SCORE 28.7   METS Score (Will be calculated only when all the questions are answered) 6.3          Caprini DVT Assessment      Flowsheet Row Most Recent Value   DVT Score 10   Current Status Major surgery planned, including arthroscopic and laproscopic (1-2 hours), Elective major lower extremity arthroplasty   Age 60-75 years   BMI 30 or less          Modified Frailty Index    No data to display       CHADS2 Stroke Risk  Current as of 26 minutes ago        N/A 3 to 100%: High Risk   2 to < 3%: Medium Risk   0 to < 2%: Low Risk     Last Change: N/A          This score determines the patient's risk of having a  stroke if the patient has atrial fibrillation.        This score is not applicable to this patient. Components are not calculated.          Revised Cardiac Risk Index      Flowsheet Row Most Recent Value   Revised Cardiac Risk Calculator 0          Apfel Simplified Score      Flowsheet Row Most Recent Value   Apfel Simplified Score Calculator 1          Risk Analysis Index Results This Encounter         9/16/2024  0831             LOAIZA Cancer History: Patient does not indicate history of cancer    Total Risk Analysis Index Score Without Cancer: 23    Total Risk Analysis Index Score: 23          Stop Bang Score      Flowsheet Row Most Recent Value   Do you snore loudly? 1   Do you often feel tired or fatigued after your sleep? 0   Has anyone ever observed you stop breathing in your sleep? 0   Do you have or are you being treated for high blood pressure? 1   Recent BMI (Calculated) 31.3   Is BMI greater than 35 kg/m2? 0=No   Age older than 50 years old? 1=Yes   Is your neck circumference greater than 17 inches (Male) or 16 inches (Female)? 1   Gender - Male 1=Yes   STOP-BANG Total Score 5                Assessment and Plan:     HPI 67 y/o male scheduled for total knee replacement on 10/7/2024 with  Dr. Winter secondary to knee pain.  PMHX includes knee pain, HTN.  PAT is consulted today for perioperative risk stratification and optimization.    Neuro:  No neurologic diagnosis, however, the patient is at increased risk for perioperative delirium secondary to  age  Patient is at increased risk for perioperative CVA secondary to  HTN, increased age    HEENT:  No HEENT diagnosis or significant findings on chart review or clinical presentation and evaluation. No further preoperative testing/intervention indicated at this time.    Cardiovascular:  Follows with [facility], Dr. Moreno.  Last seen 1/2023 for follow up   Stress 1/2023:  METs 7, normal LV fx; no evidence of ischemia; EF 55%  Blood Pressure controlled   Hold aspirin 1 week  prior to procedure  Hold [facility] day of surgery  METS: 6.3  RCRI: 0 points, 3.9%  risk for postoperative MACE   ZIA: 0.2% risk for 30 day postoperative MACE  EKG - as above    Pulmonary:  No pulmonary diagnosis, however patient is at increased risk of perioperative complications secondary to  age > 60, Tobacco abuse  Stop Bang score is 5 placing patient at high risk for ANNETTE  ARISCAT: <26 points, 1.6% risk of in-hospital postoperative pulmonary complication  PRODIGY: High risk for opioid induced respiratory depression  Pumonary education discussed, patient also provided deep breathing exercises with educational handout    Renal:   No renal diagnosis, however patient is at increase risk for perioperative renal complications secondary to  Age equal to or greater than 56, BMI equal to or greater than 30, HTN      Endocrine:  No endocrine diagnosis or significant findings on chart review or clinical presentation and evaluation. No further testing or intervention is indicated at this time.    Hematologic:  No hematologic diagnosis, however patient is at an increased risk for DVT  Caprini Score 10, patient at High risk for perioperative DVT.  Patient provided with VTE education/handout.    Gastrointestinal:   No GI diagnosis or significant findings on chart review or clinical presentation and evaluation.   Apfel 1    Orthopedic surgery  Follows with Dr. Winter.  Last seen 7/30/2024 for left knee pain  Joint class attended  Plan for total knee      Infectious disease:   No infectious diagnosis or significant findings on chart review or clinical presentation and evaluation.   Prescription provided for CHG body wash and dental rinse. CHG use instructions reviewed and provided to patient.  Staph screen collected    Musculoskeletal:   No diagnosis or significant findings on chart review or clinical presentation and evaluation.     Anesthesia/Airway:  No anesthesia complications      Medication instructions and NPO guidelines  reviewed with the patient.  All questions or concerns discussed and addressed.  Holding Mobic 1 week prior to procedure    Labs and EKG ordered

## 2024-09-16 NOTE — H&P (VIEW-ONLY)
CPM/PAT Evaluation       Name: Marcio Bower (Marcio Bower)  /Age: 1955/68 y.o.     Visit Type:   In-Person       Chief Complaint: BL knee pain    HPI 67 y/o male scheduled for total knee replacement on 10/7/2024 with  Dr. Winter secondary to knee pain.  PMHX includes knee pain, HTN.  PAT is consulted today for perioperative risk stratification and optimization.      Past Medical History:   Diagnosis Date    Arthritis     Cellulitis     bilateral upper extremities    Hyperlipidemia     Hypertension     Wears dentures     full upper denture   having 7 lower teeth extracted on , then will be edentulous       Past Surgical History:   Procedure Laterality Date    KNEE ARTHROSCOPY W/ MENISCAL REPAIR Left     X2    RETINAL DETACHMENT SURGERY         Patient  has no history on file for sexual activity.    Family History   Problem Relation Name Age of Onset    Hypertension Mother      Breast cancer Mother      Coronary artery disease Father      Lung cancer Sister      Acute myelogenous leukemia Brother         No Known Allergies    Prior to Admission medications    Medication Sig Start Date End Date Taking? Authorizing Provider   atorvastatin (Lipitor) 20 mg tablet Take 1 tablet (20 mg) by mouth once daily.    Historical Provider, MD   chlorhexidine (Hibiclens) 4 % external liquid Apply 1 Application topically once daily for 5 days. Use per CPM/PAT provided instructions 24  TAYE Ferguson   chlorhexidine (Peridex) 0.12 % solution Use 15 mL in the mouth or throat once daily for 2 doses. 24  TAYE Ferguson   meloxicam (Mobic) 15 mg tablet Take 1 tablet (15 mg) by mouth once daily. 24  Desmond Winter MD   olmesartan (BENIcar) 20 mg tablet Take 1 tablet (20 mg) by mouth once daily.    Historical Provider, MD PARKS ROS:   Constitutional:   neg    Neuro/Psych:   neg    Eyes:   neg    Ears:   neg    Nose:   Mouth:   Throat:   neg    Neck:    neg    Cardio:   neg    Respiratory:   neg    Endocrine:   GI:   neg    :   neg    Musculoskeletal:   neg    Hematologic:   neg    Skin:      Physical Exam  Vitals reviewed.   Constitutional:       Appearance: Normal appearance.   HENT:      Mouth/Throat:      Mouth: Mucous membranes are moist.      Pharynx: Oropharynx is clear.   Eyes:      Pupils: Pupils are equal, round, and reactive to light.   Cardiovascular:      Rate and Rhythm: Normal rate and regular rhythm.      Pulses: Normal pulses.      Heart sounds: Normal heart sounds.   Pulmonary:      Effort: Pulmonary effort is normal.      Breath sounds: Normal breath sounds.   Abdominal:      General: Bowel sounds are normal.      Palpations: Abdomen is soft.   Musculoskeletal:         General: Normal range of motion.      Cervical back: Normal range of motion and neck supple.   Skin:     General: Skin is warm and dry.   Neurological:      General: No focal deficit present.      Mental Status: He is alert and oriented to person, place, and time.   Psychiatric:         Mood and Affect: Mood normal.         Behavior: Behavior normal.          PAT AIRWAY:   Airway:     Mallampati::  II    Neck ROM::  Full   No teeth      Visit Vitals  /85   Pulse 91   Temp 36.3 °C (97.3 °F)   Resp 18       DASI Risk Score      Flowsheet Row Most Recent Value   DASI SCORE 28.7   METS Score (Will be calculated only when all the questions are answered) 6.3          Caprini DVT Assessment      Flowsheet Row Most Recent Value   DVT Score 10   Current Status Major surgery planned, including arthroscopic and laproscopic (1-2 hours), Elective major lower extremity arthroplasty   Age 60-75 years   BMI 30 or less          Modified Frailty Index    No data to display       CHADS2 Stroke Risk  Current as of 26 minutes ago        N/A 3 to 100%: High Risk   2 to < 3%: Medium Risk   0 to < 2%: Low Risk     Last Change: N/A          This score determines the patient's risk of having a  stroke if the patient has atrial fibrillation.        This score is not applicable to this patient. Components are not calculated.          Revised Cardiac Risk Index      Flowsheet Row Most Recent Value   Revised Cardiac Risk Calculator 0          Apfel Simplified Score      Flowsheet Row Most Recent Value   Apfel Simplified Score Calculator 1          Risk Analysis Index Results This Encounter         9/16/2024  0831             LOAIZA Cancer History: Patient does not indicate history of cancer    Total Risk Analysis Index Score Without Cancer: 23    Total Risk Analysis Index Score: 23          Stop Bang Score      Flowsheet Row Most Recent Value   Do you snore loudly? 1   Do you often feel tired or fatigued after your sleep? 0   Has anyone ever observed you stop breathing in your sleep? 0   Do you have or are you being treated for high blood pressure? 1   Recent BMI (Calculated) 31.3   Is BMI greater than 35 kg/m2? 0=No   Age older than 50 years old? 1=Yes   Is your neck circumference greater than 17 inches (Male) or 16 inches (Female)? 1   Gender - Male 1=Yes   STOP-BANG Total Score 5                Assessment and Plan:     HPI 67 y/o male scheduled for total knee replacement on 10/7/2024 with  Dr. Winter secondary to knee pain.  PMHX includes knee pain, HTN.  PAT is consulted today for perioperative risk stratification and optimization.    Neuro:  No neurologic diagnosis, however, the patient is at increased risk for perioperative delirium secondary to  age  Patient is at increased risk for perioperative CVA secondary to  HTN, increased age    HEENT:  No HEENT diagnosis or significant findings on chart review or clinical presentation and evaluation. No further preoperative testing/intervention indicated at this time.    Cardiovascular:  Follows with [facility], Dr. Moreno.  Last seen 1/2023 for follow up   Stress 1/2023:  METs 7, normal LV fx; no evidence of ischemia; EF 55%  Blood Pressure controlled   Hold aspirin 1 week  prior to procedure  Hold [facility] day of surgery  METS: 6.3  RCRI: 0 points, 3.9%  risk for postoperative MACE   ZIA: 0.2% risk for 30 day postoperative MACE  EKG - as above    Pulmonary:  No pulmonary diagnosis, however patient is at increased risk of perioperative complications secondary to  age > 60, Tobacco abuse  Stop Bang score is 5 placing patient at high risk for ANNETTE  ARISCAT: <26 points, 1.6% risk of in-hospital postoperative pulmonary complication  PRODIGY: High risk for opioid induced respiratory depression  Pumonary education discussed, patient also provided deep breathing exercises with educational handout    Renal:   No renal diagnosis, however patient is at increase risk for perioperative renal complications secondary to  Age equal to or greater than 56, BMI equal to or greater than 30, HTN      Endocrine:  No endocrine diagnosis or significant findings on chart review or clinical presentation and evaluation. No further testing or intervention is indicated at this time.    Hematologic:  No hematologic diagnosis, however patient is at an increased risk for DVT  Caprini Score 10, patient at High risk for perioperative DVT.  Patient provided with VTE education/handout.    Gastrointestinal:   No GI diagnosis or significant findings on chart review or clinical presentation and evaluation.   Apfel 1    Orthopedic surgery  Follows with Dr. Winter.  Last seen 7/30/2024 for left knee pain  Joint class attended  Plan for total knee      Infectious disease:   No infectious diagnosis or significant findings on chart review or clinical presentation and evaluation.   Prescription provided for CHG body wash and dental rinse. CHG use instructions reviewed and provided to patient.  Staph screen collected    Musculoskeletal:   No diagnosis or significant findings on chart review or clinical presentation and evaluation.     Anesthesia/Airway:  No anesthesia complications      Medication instructions and NPO guidelines  reviewed with the patient.  All questions or concerns discussed and addressed.  Holding Mobic 1 week prior to procedure    Labs and EKG ordered

## 2024-09-16 NOTE — PREPROCEDURE INSTRUCTIONS
Thank you for visiting Preadmission Testing (PAT) today for your pre-procedure evaluation, you were seen by     Melinda Min CNP  Pre Admission Testing  [facility]  [phone]    This summary includes instructions and information to aid you during your perioperative period.  Please read carefully. If you have any questions about your visit today, please call the number listed above.  If you become ill or have any changes to your health before your surgery, please contact your primary care provider and alert your surgeon.        Preparing for your Surgery       Exercises  Preoperative Deep Breathing Exercises  Why it is important to do deep breathing exercises before my surgery?  Deep breathing exercises strengthen your breathing muscles.  This helps you to recover after your surgery and decreases the chance of breathing complications.  How are the deep breathing exercises done?  Sit straight with your back supported.  Breathe in deeply and slowly through your nose. Your lower rib cage should expand and your abdomen may move forward.  Hold that breath for 3 to 5 seconds.  Breathe out through pursed lips, slowly and completely.  Rest and repeat 10 times every hour while awake.  Rest longer if you become dizzy or lightheaded.       Preoperative Brain Exercises    What are brain exercises?  A brain exercise is any activity that engages your thinking (cognitive) skills.    What types of activities are considered brain exercises?  Jigsaw puzzles, crossword puzzles, word jumble, memory games, word search, and many more.  Many can be found free online or on your phone via a mobile aubree.    Why should I do brain exercises before my surgery?  More recent research has shown brain exercise before surgery can lower the risk of postoperative delirium (confusion) which can be especially important for older adults.  Patients who did brain exercises for 5 to 10 hours the days before surgery, cut their  risk of postoperative delirium in half up to 1 week after surgery.    Sit-to-Stand Exercise    What is the sit-to-stand exercise?  The sit-to-stand exercise strengthens the muscles of your lower body and muscles in the center of your body (core muscles for stability) helping to maintain and improve your strength and mobility.  How do I do the sit-to-stand exercise?  The goal is to do this exercise without using your arms or hands.  If this is too difficult, use your arms and hands or a chair with armrests to help slowly push yourself to the standing position and lower yourself back to the sitting position. As the movement becomes easier use your arms and hands less.    Steps to the sit-to-stand exercise  Sit up tall in a sturdy chair, knees bent, feet flat on the floor shoulder-width apart.  Shift your hips/pelvis forward in the chair to correctly position yourself for the next movement.  Lean forward at your hips.  Stand up straight putting equal weight on both feet.  Check to be sure you are properly aligned with the chair, in a slow controlled movement sit back down.  Repeat this exercise 10-15 times.  If needed you can do it fewer times until your strength improves.  Rest for 1 minute.  Do another 10-15 sit-to-stand exercises.  Try to do this in the morning and evening.        Instructions    Preoperative Fasting Guidelines    Why must I stop eating and drinking near surgery time?  With sedation, food or liquid in your stomach can enter your lungs causing serious complications  Food can increase nausea and vomiting  When do I need to stop eating and drinking before my surgery?      Do not eat any food after midnight the night before your surgery/procedure. You may have up to 13.5 ounces of clear liquid until TWO hours before your instructed arrival time to the hospital.  This includes water, black tea/coffee, (no milk or cream) apple juice, and electrolyte drinks (Gatorade). You may chew gum until TWO hours  before your surgery/procedure            Simple things you can do to help prevent blood clots     Blood clots are blockages that can form in the body's veins. When a blood clot forms in your deep veins, it may be called a deep vein thrombosis, or DVT for short. Blood clots can happen in any part of the body where blood flows, but they are most common in the arms and legs. If a piece of a blood clot breaks free and travels to the lungs, it is called a pulmonary embolus (PE). A PE can be a very serious problem.         Being in the hospital or having surgery can raise your chances of getting a blood clot because you may not be well enough to move around as much as you normally do.         Ways you can help prevent blood clots in the hospital       Wearing SCDs  SCDs stands for Sequential Compression Devices.   SCDs are special sleeves that wrap around your legs. They attach to a pump that fills them with air to gently squeeze your legs every few minutes.  This helps return the blood in your legs to your heart.   SCDs should only be taken off when walking or bathing. SCDs may not be comfortable, but they can help save your life.              Pump SCD leg sleeves  Wearing compression stockings - if your doctor orders them. These special snug-fitting stockings gently squeeze your legs to help blood flow.       Walking. Walking helps move the blood in your legs.   If your doctor says it is ok, try walking the halls at least   5 times a day. Ask us to help you get up, so you don't fall.      Taking any blood-thinning medicines your doctor orders.              Ways you can help prevent blood clots at home         Wearing compression stockings - if your doctor orders them.   Walking - to help move the blood in your legs.    Taking any blood-thinning medicines your doctor orders.      Signs of a blood clot or PE    Tell your doctor or nurse right away if you have any of the problems listed below.         If you are at home,  seek medical care right away. Call 911 for chest pain or problems breathing.            Signs of a blood clot (DVT) - such as pain, swelling, redness, or warmth in your arm or legs.  Signs of a pulmonary embolism (PE) - such as chest pain or feeling short of breath      Tobacco and Alcohol;  Do not drink alcohol or smoke within 24 hours of surgery.  It is best to quit smoking for as long as possible before any surgery or procedure.    Quitting Smoking; Recognizing Dangerous Situations:  1-Alcohol use during the first month after quitting, 2-Being around smoke or someone who smokes 3-Times situation routinely smoked  4-Triggers-car, breaks, coffee, when awakening, social events  Coping Skills: 1-Learning new ways to manage stress 2-Exercising 3-Relaxation breathing   4-Change routines 5-Distraction techniques    Websites:  Smoke-Free - offers free text messages and an aubree to help you quit. Info includes eating and mood issues that may come with quitting. There is a Live Helpline to talk to an expert. Go to smokefree.gov; Become an Ex-Smoker - the free EX Plan is based on scientific research and useful advice from ex-smokers. It isn't just about quitting smoking.  It's about re-learning life without cigarettes using a 3-step program.  Go to becomeanex.org   Centers for Disease Control offer many suggestions for helping you quit. Includes a Quit Guide and real-life stories. There are sections for specific groups such as LGBT, , different ethnic groups, and pregnant women.  Go to cdc.gov/tobacco/campaign/tips    Other Resources:  [place] Tobacco Quit Line - call 1-800-QUIT-NOW or 1-188.822.8297.  [facility] 2-1-1 - to find local programs and resources. Call 211 or go to 211.org.  [facility] Tobacco Cessation Program - call 315-516-0668.  American Lung Association offers classes for quitting smoking. Some places may charge a fee. For a list of classes, go to lung.org or call 1-800-LUNG-[place].     Some things  "to think about:; The health benefits of quitting smoking can help most of the major parts of your body. There is no safe amount of cigarette smoke. Quitting smoking can add years to your life. When you quit, you'll also protect your loved ones from dangerous secondhand smoke. Make a plan, join a support group, and talk to your physician to assist in quitting smoking.                                                                                                                Other Instructions  Why did I have my nose, under my arms, and groin swabbed? The purpose of the swab is to identify Staphylococcus aureus inside your nose or on your skin.  The swab was sent to the laboratory for culture.  A positive swab/culture for Staphylococcus aureus is called colonization or carriage.     What is Staphylococcus aureus? Staphylococcus aureus, also known as \"staph\", is a germ found on the skin or in the nose of healthy people.  Sometimes Staphylococcus aureus can get into the body and cause an infection.  This can be minor (such as pimples, boils, or other skin problems).  It might also be serious (such as a blood infection, pneumonia, or a surgical site infection).     What is Staphylococcus aureus colonization or carriage? Colonization or carriage means that a person has the germ but is not sick from it.  These bacteria can be spread on the hands or when breathing or sneezing.   How is Staphylococcus aureus spread? It is most often spread by close contact with a person or item that carries it.   What happens if my culture is positive for Staphylococcus aureus? Your doctor/medical team will use this information to guide any antibiotic treatment which may be necessary.  Regardless of the culture results, we will clean the inside of your nose with a betadine swab just before you have your surgery.   Will I get an infection if I have Staphylococcus aureus in my nose or on my skin? Anyone can get an infection with " Staphylococcus aureus.  However, the best way to reduce your risk of infection is to follow the instructions provided to you for the use of your CHG soap and dental rinse.      Body Wash:     What is a home preoperative antibacterial shower? This shower is a way of cleaning the skin with a germ-killing solution before surgery.  The solution contains chlorhexidine, commonly known as CHG.  CHG is a skin cleanser with germ-killing ability.  Let your doctor know if you are allergic to chlorhexidine.   Why do I need to take a preoperative antibacterial shower? Skin is not sterile.  It is best to try to make your skin as free of germs as possible before surgery.  Proper cleansing with a germ-killing soap before surgery can lower the number of germs on your skin.  This helps to reduce the risk of infection at the surgical site.    Following the instructions listed below will help you prepare your skin for surgery.   How do I use the solution? Steps:  Begin using your CHG soap 5 days before your scheduled surgery on ___10/7/2024___.     Keep CHG soap away from ear canals and eyes.  Rinse completely, do not condition.  Hair extensions should be removed. ,      Oral/Dental Rinse:     What is oral/dental rinse?  It is a mouthwash. It is a way of cleaning the mouth with a germ-killing solution before your surgery.  The solution contains chlorhexidine, commonly known as CHG. It is used inside the mouth to kill a bacteria known as Staphylococcus aureus.  Let your doctor know if you are allergic to Chlorhexidine.   Why do I need to use CHG oral/dental rinse? The CHG oral/dental rinse helps to kill a bacteria in your mouth known as Staphylococcus aureus.  This reduces the risk of infection at the surgical site.    Using your CHG oral/dental rinse STEPS: Use your CHG oral/dental rinse after you brush your teeth the night before (at bedtime) and the morning of your surgery.  Follow all directions on your prescription label.    Use  the cap on the container to measure 15 ml.  Swish (gargle if you can) the mouthwash in your mouth for at least 30 seconds, (do not swallow) and spit out.  After you use your CHG rinse, do not rinse your mouth with water, drink or eat.    Please refer to the prescription label for the appropriate time to resume oral intake   What side effects might I have using the CHG oral/dental rinse? CHG rinse will stick to plaque on the teeth.  Brush and floss just before use.  Teeth brushing will help avoid staining of plaque during use.          The Week before Surgery        Seven days before Surgery  Check your PAT medication instructions  Do the exercises provided to you by PAT  Arrange for a responsible, adult licensed  to take you home after surgery and stay with you for 24 hours.  You will not be permitted to drive yourself home if you have received any anesthetic/sedation  Six days before surgery  Check your PAT medication instructions  Do the exercises provided to you by PAT  Start using Chlorhexidene (CHG) body wash if prescribed  Five days before surgery  Check your PAT medication instructions  Do the exercises provided to you by PAT  Continue to use CHG body wash if prescribed  Three days before surgery  Check your PAT medication instructions  Do the exercises provided to you by PAT  Continue to use CHG body wash if prescribed  Two days before surgery  Check your PAT medication instructions  Do the exercises provided to you by PAT  Continue to use CHG body wash if prescribed    The Day before Surgery       Check your PAT medication and all other PAT instructions including when to stop eating and drinking  You will be called with your arrival time for surgery in the late afternoon.  If you do not receive a call please reach out to [facility] Pre-Op. [phone]  Do not smoke or drink 24 hours before surgery  Prepare items to bring with you to the hospital  Shower with your chlorhexidine wash if prescribed  Brush  your teeth and use your chlorhexidine dental rinse if prescribed    The Day of Surgery       Check your PAT medication instructions  Ensure you follow the instructions for when to stop eating and drinking  Shower, if prescribed use CHG.  Do not apply any lotions, creams, moisturizers, perfume or deodorant  Brush your teeth and use your CHG dental rinse if prescribed  Wear loose comfortable clothing  Avoid make-up  Remove  jewelry and piercings, consider professional piercing removal with a plastic spacer if needed  Bring photo ID and Insurance card  Bring an accurate medication list that includes medication dose, frequency and allergies  Bring a copy of your advanced directives (will, health care power of )  Bring any devices and controllers as well as medical devices you have been provided with for surgery (CPAP, slings, braces, etc.)  Dentures, eyeglasses, and contacts will be removed before surgery, please bring cases for contacts or glasses

## 2024-09-18 LAB — STAPHYLOCOCCUS SPEC CULT: NORMAL

## 2024-10-02 ENCOUNTER — ANESTHESIA EVENT (OUTPATIENT)
Dept: OPERATING ROOM | Facility: HOSPITAL | Age: 69
End: 2024-10-02
Payer: MEDICARE

## 2024-10-04 RX ORDER — TRANEXAMIC ACID 100 MG/ML
1000 INJECTION, SOLUTION INTRAVENOUS ONCE
Status: CANCELLED | OUTPATIENT
Start: 2024-10-04 | End: 2024-10-04

## 2024-10-07 ENCOUNTER — HOME HEALTH ADMISSION (OUTPATIENT)
Dept: HOME HEALTH SERVICES | Facility: HOME HEALTH | Age: 69
End: 2024-10-07
Payer: MEDICARE

## 2024-10-07 ENCOUNTER — DOCUMENTATION (OUTPATIENT)
Dept: HOME HEALTH SERVICES | Facility: HOME HEALTH | Age: 69
End: 2024-10-07
Payer: MEDICARE

## 2024-10-07 ENCOUNTER — PHARMACY VISIT (OUTPATIENT)
Dept: PHARMACY | Facility: CLINIC | Age: 69
End: 2024-10-07
Payer: COMMERCIAL

## 2024-10-07 ENCOUNTER — ANESTHESIA (OUTPATIENT)
Dept: OPERATING ROOM | Facility: HOSPITAL | Age: 69
End: 2024-10-07
Payer: MEDICARE

## 2024-10-07 ENCOUNTER — APPOINTMENT (OUTPATIENT)
Dept: RADIOLOGY | Facility: HOSPITAL | Age: 69
End: 2024-10-07
Payer: MEDICARE

## 2024-10-07 ENCOUNTER — HOSPITAL ENCOUNTER (OUTPATIENT)
Facility: HOSPITAL | Age: 69
Setting detail: OUTPATIENT SURGERY
Discharge: HOME HEALTH | End: 2024-10-07
Attending: ORTHOPAEDIC SURGERY | Admitting: ORTHOPAEDIC SURGERY
Payer: MEDICARE

## 2024-10-07 VITALS
HEIGHT: 72 IN | RESPIRATION RATE: 15 BRPM | BODY MASS INDEX: 30.58 KG/M2 | OXYGEN SATURATION: 96 % | HEART RATE: 77 BPM | WEIGHT: 225.75 LBS | SYSTOLIC BLOOD PRESSURE: 118 MMHG | DIASTOLIC BLOOD PRESSURE: 79 MMHG | TEMPERATURE: 97.5 F

## 2024-10-07 DIAGNOSIS — M17.12 PRIMARY OSTEOARTHRITIS OF LEFT KNEE: ICD-10-CM

## 2024-10-07 DIAGNOSIS — M25.561 ACUTE BILATERAL KNEE PAIN: Primary | ICD-10-CM

## 2024-10-07 DIAGNOSIS — M25.562 ACUTE BILATERAL KNEE PAIN: Primary | ICD-10-CM

## 2024-10-07 PROBLEM — E78.5 HYPERLIPIDEMIA: Status: ACTIVE | Noted: 2024-10-07

## 2024-10-07 PROBLEM — I10 HTN (HYPERTENSION): Status: ACTIVE | Noted: 2024-10-07

## 2024-10-07 PROCEDURE — 7100000009 HC PHASE TWO TIME - INITIAL BASE CHARGE: Performed by: ORTHOPAEDIC SURGERY

## 2024-10-07 PROCEDURE — 2500000005 HC RX 250 GENERAL PHARMACY W/O HCPCS: Performed by: NURSE PRACTITIONER

## 2024-10-07 PROCEDURE — 7100000002 HC RECOVERY ROOM TIME - EACH INCREMENTAL 1 MINUTE: Performed by: ORTHOPAEDIC SURGERY

## 2024-10-07 PROCEDURE — 2500000001 HC RX 250 WO HCPCS SELF ADMINISTERED DRUGS (ALT 637 FOR MEDICARE OP): Performed by: ORTHOPAEDIC SURGERY

## 2024-10-07 PROCEDURE — 73560 X-RAY EXAM OF KNEE 1 OR 2: CPT | Mod: LEFT SIDE | Performed by: RADIOLOGY

## 2024-10-07 PROCEDURE — 2780000003 HC OR 278 NO HCPCS: Performed by: ORTHOPAEDIC SURGERY

## 2024-10-07 PROCEDURE — 2500000004 HC RX 250 GENERAL PHARMACY W/ HCPCS (ALT 636 FOR OP/ED): Performed by: ORTHOPAEDIC SURGERY

## 2024-10-07 PROCEDURE — A6213 FOAM DRG >16<=48 SQ IN W/BDR: HCPCS | Performed by: ORTHOPAEDIC SURGERY

## 2024-10-07 PROCEDURE — 3700000002 HC GENERAL ANESTHESIA TIME - EACH INCREMENTAL 1 MINUTE: Performed by: ORTHOPAEDIC SURGERY

## 2024-10-07 PROCEDURE — 27447 TOTAL KNEE ARTHROPLASTY: CPT | Performed by: ORTHOPAEDIC SURGERY

## 2024-10-07 PROCEDURE — 2500000004 HC RX 250 GENERAL PHARMACY W/ HCPCS (ALT 636 FOR OP/ED): Performed by: ANESTHESIOLOGY

## 2024-10-07 PROCEDURE — 7100000001 HC RECOVERY ROOM TIME - INITIAL BASE CHARGE: Performed by: ORTHOPAEDIC SURGERY

## 2024-10-07 PROCEDURE — 97161 PT EVAL LOW COMPLEX 20 MIN: CPT | Mod: GP

## 2024-10-07 PROCEDURE — 73560 X-RAY EXAM OF KNEE 1 OR 2: CPT | Mod: LT

## 2024-10-07 PROCEDURE — 7100000010 HC PHASE TWO TIME - EACH INCREMENTAL 1 MINUTE: Performed by: ORTHOPAEDIC SURGERY

## 2024-10-07 PROCEDURE — C1776 JOINT DEVICE (IMPLANTABLE): HCPCS | Performed by: ORTHOPAEDIC SURGERY

## 2024-10-07 PROCEDURE — 2500000005 HC RX 250 GENERAL PHARMACY W/O HCPCS: Performed by: ORTHOPAEDIC SURGERY

## 2024-10-07 PROCEDURE — 2720000007 HC OR 272 NO HCPCS: Performed by: ORTHOPAEDIC SURGERY

## 2024-10-07 PROCEDURE — 2500000004 HC RX 250 GENERAL PHARMACY W/ HCPCS (ALT 636 FOR OP/ED): Performed by: NURSE ANESTHETIST, CERTIFIED REGISTERED

## 2024-10-07 PROCEDURE — 97110 THERAPEUTIC EXERCISES: CPT | Mod: GP

## 2024-10-07 PROCEDURE — 3600000017 HC OR TIME - EACH INCREMENTAL 1 MINUTE - PROCEDURE LEVEL SIX: Performed by: ORTHOPAEDIC SURGERY

## 2024-10-07 PROCEDURE — 3600000018 HC OR TIME - INITIAL BASE CHARGE - PROCEDURE LEVEL SIX: Performed by: ORTHOPAEDIC SURGERY

## 2024-10-07 PROCEDURE — 3700000001 HC GENERAL ANESTHESIA TIME - INITIAL BASE CHARGE: Performed by: ORTHOPAEDIC SURGERY

## 2024-10-07 PROCEDURE — RXMED WILLOW AMBULATORY MEDICATION CHARGE

## 2024-10-07 PROCEDURE — 2500000005 HC RX 250 GENERAL PHARMACY W/O HCPCS: Performed by: NURSE ANESTHETIST, CERTIFIED REGISTERED

## 2024-10-07 DEVICE — ATTUNE FEMORAL POROCOAT POSTERIOR STABILIZED SIZE 8 LEFT CEMENTLESS
Type: IMPLANTABLE DEVICE | Site: KNEE | Status: FUNCTIONAL
Brand: ATTUNE

## 2024-10-07 DEVICE — ATTUNE KNEE SYSTEM TIBIAL INSERT ROTATING PLATFORM POSTERIOR STABILIZED SIZE 8 8MM AOX
Type: IMPLANTABLE DEVICE | Site: KNEE | Status: FUNCTIONAL
Brand: ATTUNE

## 2024-10-07 DEVICE — ATTUNE KNEE SYSTEM TIBIAL BASE POROCOAT ROTATING PLATFORM SIZE 8 CEMENTLESS
Type: IMPLANTABLE DEVICE | Site: KNEE | Status: FUNCTIONAL
Brand: ATTUNE

## 2024-10-07 RX ORDER — CELECOXIB 400 MG/1
400 CAPSULE ORAL ONCE
Status: COMPLETED | OUTPATIENT
Start: 2024-10-07 | End: 2024-10-07

## 2024-10-07 RX ORDER — POLYETHYLENE GLYCOL 3350 17 G/17G
17 POWDER, FOR SOLUTION ORAL DAILY
Qty: 238 G | Refills: 0 | Status: SHIPPED | OUTPATIENT
Start: 2024-10-07

## 2024-10-07 RX ORDER — DOCUSATE SODIUM 100 MG/1
100 CAPSULE, LIQUID FILLED ORAL 2 TIMES DAILY
Qty: 20 CAPSULE | Refills: 0 | Status: SHIPPED | OUTPATIENT
Start: 2024-10-07 | End: 2024-10-17

## 2024-10-07 RX ORDER — PROPOFOL 10 MG/ML
INJECTION, EMULSION INTRAVENOUS AS NEEDED
Status: DISCONTINUED | OUTPATIENT
Start: 2024-10-07 | End: 2024-10-07

## 2024-10-07 RX ORDER — OXYCODONE HYDROCHLORIDE 5 MG/1
5 TABLET ORAL EVERY 4 HOURS PRN
Status: DISCONTINUED | OUTPATIENT
Start: 2024-10-07 | End: 2024-10-07 | Stop reason: HOSPADM

## 2024-10-07 RX ORDER — LIDOCAINE HCL/PF 100 MG/5ML
SYRINGE (ML) INTRAVENOUS AS NEEDED
Status: DISCONTINUED | OUTPATIENT
Start: 2024-10-07 | End: 2024-10-07

## 2024-10-07 RX ORDER — ACETAMINOPHEN 325 MG/1
975 TABLET ORAL ONCE
Status: COMPLETED | OUTPATIENT
Start: 2024-10-07 | End: 2024-10-07

## 2024-10-07 RX ORDER — CEFADROXIL 500 MG/1
500 CAPSULE ORAL 2 TIMES DAILY
Qty: 14 CAPSULE | Refills: 0 | Status: SHIPPED | OUTPATIENT
Start: 2024-10-07 | End: 2024-10-14

## 2024-10-07 RX ORDER — DIPHENHYDRAMINE HYDROCHLORIDE 50 MG/ML
12.5 INJECTION INTRAMUSCULAR; INTRAVENOUS ONCE AS NEEDED
Status: DISCONTINUED | OUTPATIENT
Start: 2024-10-07 | End: 2024-10-07 | Stop reason: HOSPADM

## 2024-10-07 RX ORDER — CEFAZOLIN 1 G/1
INJECTION, POWDER, FOR SOLUTION INTRAVENOUS AS NEEDED
Status: DISCONTINUED | OUTPATIENT
Start: 2024-10-07 | End: 2024-10-07

## 2024-10-07 RX ORDER — DROPERIDOL 2.5 MG/ML
0.62 INJECTION, SOLUTION INTRAMUSCULAR; INTRAVENOUS ONCE AS NEEDED
Status: DISCONTINUED | OUTPATIENT
Start: 2024-10-07 | End: 2024-10-07 | Stop reason: HOSPADM

## 2024-10-07 RX ORDER — FENTANYL CITRATE 50 UG/ML
INJECTION, SOLUTION INTRAMUSCULAR; INTRAVENOUS AS NEEDED
Status: DISCONTINUED | OUTPATIENT
Start: 2024-10-07 | End: 2024-10-07

## 2024-10-07 RX ORDER — SODIUM CHLORIDE 0.9 G/100ML
IRRIGANT IRRIGATION AS NEEDED
Status: DISCONTINUED | OUTPATIENT
Start: 2024-10-07 | End: 2024-10-07 | Stop reason: HOSPADM

## 2024-10-07 RX ORDER — MIDAZOLAM HYDROCHLORIDE 1 MG/ML
INJECTION, SOLUTION INTRAMUSCULAR; INTRAVENOUS AS NEEDED
Status: DISCONTINUED | OUTPATIENT
Start: 2024-10-07 | End: 2024-10-07

## 2024-10-07 RX ORDER — MEPERIDINE HYDROCHLORIDE 25 MG/ML
12.5 INJECTION INTRAMUSCULAR; INTRAVENOUS; SUBCUTANEOUS EVERY 10 MIN PRN
Status: DISCONTINUED | OUTPATIENT
Start: 2024-10-07 | End: 2024-10-07 | Stop reason: HOSPADM

## 2024-10-07 RX ORDER — OXYCODONE AND ACETAMINOPHEN 5; 325 MG/1; MG/1
1 TABLET ORAL EVERY 4 HOURS PRN
Qty: 36 TABLET | Refills: 0 | Status: SHIPPED | OUTPATIENT
Start: 2024-10-07 | End: 2024-10-13

## 2024-10-07 RX ORDER — SODIUM CHLORIDE, SODIUM LACTATE, POTASSIUM CHLORIDE, CALCIUM CHLORIDE 600; 310; 30; 20 MG/100ML; MG/100ML; MG/100ML; MG/100ML
20 INJECTION, SOLUTION INTRAVENOUS CONTINUOUS
Status: DISCONTINUED | OUTPATIENT
Start: 2024-10-07 | End: 2024-10-07 | Stop reason: HOSPADM

## 2024-10-07 RX ORDER — ASPIRIN 81 MG/1
81 TABLET ORAL 2 TIMES DAILY
Qty: 60 TABLET | Refills: 0 | Status: SHIPPED | OUTPATIENT
Start: 2024-10-08 | End: 2024-11-07

## 2024-10-07 RX ORDER — ONDANSETRON HYDROCHLORIDE 2 MG/ML
4 INJECTION, SOLUTION INTRAVENOUS ONCE AS NEEDED
Status: DISCONTINUED | OUTPATIENT
Start: 2024-10-07 | End: 2024-10-07 | Stop reason: HOSPADM

## 2024-10-07 RX ORDER — ALBUTEROL SULFATE 0.83 MG/ML
2.5 SOLUTION RESPIRATORY (INHALATION) ONCE AS NEEDED
Status: DISCONTINUED | OUTPATIENT
Start: 2024-10-07 | End: 2024-10-07 | Stop reason: HOSPADM

## 2024-10-07 RX ORDER — CEFAZOLIN SODIUM 2 G/100ML
2 INJECTION, SOLUTION INTRAVENOUS ONCE
Status: DISCONTINUED | OUTPATIENT
Start: 2024-10-07 | End: 2024-10-07 | Stop reason: HOSPADM

## 2024-10-07 RX ORDER — TRANEXAMIC ACID 100 MG/ML
INJECTION, SOLUTION INTRAVENOUS AS NEEDED
Status: DISCONTINUED | OUTPATIENT
Start: 2024-10-07 | End: 2024-10-07

## 2024-10-07 RX ORDER — MIDAZOLAM HYDROCHLORIDE 1 MG/ML
INJECTION INTRAMUSCULAR; INTRAVENOUS AS NEEDED
Status: DISCONTINUED | OUTPATIENT
Start: 2024-10-07 | End: 2024-10-07

## 2024-10-07 RX ORDER — BUPIVACAINE HYDROCHLORIDE 7.5 MG/ML
INJECTION, SOLUTION INTRASPINAL AS NEEDED
Status: DISCONTINUED | OUTPATIENT
Start: 2024-10-07 | End: 2024-10-07

## 2024-10-07 RX ORDER — NAPROXEN 500 MG/1
500 TABLET ORAL
Qty: 60 TABLET | Refills: 0 | Status: SHIPPED | OUTPATIENT
Start: 2024-10-08 | End: 2024-11-07

## 2024-10-07 RX ORDER — SODIUM CHLORIDE, SODIUM LACTATE, POTASSIUM CHLORIDE, CALCIUM CHLORIDE 600; 310; 30; 20 MG/100ML; MG/100ML; MG/100ML; MG/100ML
100 INJECTION, SOLUTION INTRAVENOUS CONTINUOUS
Status: DISCONTINUED | OUTPATIENT
Start: 2024-10-07 | End: 2024-10-07 | Stop reason: HOSPADM

## 2024-10-07 RX ORDER — SODIUM CHLORIDE 9 MG/ML
100 INJECTION, SOLUTION INTRAVENOUS CONTINUOUS
Status: DISCONTINUED | OUTPATIENT
Start: 2024-10-07 | End: 2024-10-07 | Stop reason: HOSPADM

## 2024-10-07 SDOH — HEALTH STABILITY: MENTAL HEALTH: CURRENT SMOKER: 0

## 2024-10-07 ASSESSMENT — COGNITIVE AND FUNCTIONAL STATUS - GENERAL
WALKING IN HOSPITAL ROOM: A LITTLE
MOBILITY SCORE: 20
STANDING UP FROM CHAIR USING ARMS: A LITTLE
MOVING TO AND FROM BED TO CHAIR: A LITTLE
CLIMB 3 TO 5 STEPS WITH RAILING: A LITTLE

## 2024-10-07 ASSESSMENT — PAIN - FUNCTIONAL ASSESSMENT
PAIN_FUNCTIONAL_ASSESSMENT: 0-10

## 2024-10-07 ASSESSMENT — PAIN SCALES - GENERAL
PAINLEVEL_OUTOF10: 0 - NO PAIN

## 2024-10-07 NOTE — ANESTHESIA PROCEDURE NOTES
Peripheral Block    Patient location during procedure: pre-op  Reason for block: at surgeon's request and post-op pain management  Staffing  Performed: attending   Authorized by: Darrion Parra MD    Performed by: Darrion Parra MD  Preanesthetic Checklist  Completed: patient identified, IV checked, site marked, risks and benefits discussed, surgical consent, monitors and equipment checked, pre-op evaluation and timeout performed   Timeout performed at:   Peripheral Block  Patient position: laying flat  Prep: ChloraPrep and site prepped and draped  Patient monitoring: heart rate and continuous pulse ox  Block type: adductor canal  Laterality: left  Injection technique: single-shot  Guidance: ultrasound guided  Needle  Needle type: short-bevel   Needle gauge: 22 G  Needle length: 8 cm  Needle localization: anatomical landmarks, ultrasound guidance and nerve stimulator  Assessment  Injection assessment: negative aspiration for heme, no paresthesia on injection, incremental injection and local visualized surrounding nerve on ultrasound  Paresthesia pain: none  Heart rate change: no  Slow fractionated injection: yes  Additional Notes  30mL 0.5% marcaine 1:300k epi 5mg decadron

## 2024-10-07 NOTE — OP NOTE
TOTAL KNEE REPLACEMENT HYBRID UNILAT (L) Operative Note     Date: 10/7/2024  OR Location: [facility] OR    Name: Marcio Bower, : 1955, Age: 68 y.o., MRN: [id number], Sex: male    Diagnosis  Pre-op Diagnosis      * Acute bilateral knee pain [M25.561, M25.562] Post-op Diagnosis     * Acute bilateral knee pain [M25.561, M25.562]     Procedures  TOTAL KNEE REPLACEMENT HYBRID UNILAT  97469 - WA ARTHRP KNE CONDYLE&PLATU MEDIAL&LAT COMPARTMENTS      Surgeons      * Desmond Winter - Primary    Resident/Fellow/Other Assistant:  Surgeons and Role:     * Jeffery Farfan PA-C - Resident - Assisting    Procedure Summary  Anesthesia: Regional, Spinal  ASA: II  Anesthesia Staff: Anesthesiologist: Darrion Parra MD  CRNA: JOSEFA Weaver-CRNA  Estimated Blood Loss: 50mL  Intra-op Medications:   Administrations occurring from 0835 to 1120 on 10/07/24:   Medication Name Total Dose   EPINEPHrine (Adrenalin) 1 mL, ketorolac (Toradol) 1 mg, morphine PF (Duramorph) 1 mg/mL 5 mg, ropivacaine (Naropin) 5 mg/mL (0.5 %) 30 mL in sodium chloride 0.9% 20 mL syringe 58 mL   sodium chloride 0.9 % irrigation solution 1,000 mL              Anesthesia Record               Intraprocedure I/O Totals          Intake    Tranexamic Acid 0.00 mL    The total shown is the total volume documented since Anesthesia Start was filed.    Total Intake 0 mL          Specimen:   ID Type Source Tests Collected by Time   1 : KNEE CONTENTS Tissue BONE RESECTION SURGICAL PATHOLOGY EXAM Ju Andrade RN 10/7/2024 1043        Staff:   Circulator: Ju  Scrub Person: Kaylin PROCTOR: Bimal         Drains and/or Catheters: * None in log *    Tourniquet Times:   * Missing tourniquet times found for documented tourniquets in lo *     Implants:  Implants       Type Name Action Serial No.      Joint FEMORAL, ATTUNE, POR PS, SIZE 8, LT - [id number] Implanted      Joint TIBAL BASE, ATTUNE RP, POR, SZ 8 - [id number] Implanted      Joint Knee INSERT,  SUSY PARRISH RP, SZ 8, 8MM - [id number] Implanted               Findings: severe djd    Indications: Marcio Bower is an 68 y.o. male who is having surgery for Acute bilateral knee pain [M25.561, M25.562].     The patient was seen in the preoperative area. The risks, benefits, complications, treatment options, non-operative alternatives, expected recovery and outcomes were discussed with the patient. The possibilities of reaction to medication, pulmonary aspiration, injury to surrounding structures, bleeding, recurrent infection, the need for additional procedures, failure to diagnose a condition, and creating a complication requiring transfusion or operation were discussed with the patient. The patient concurred with the proposed plan, giving informed consent.  The site of surgery was properly noted/marked if necessary per policy. The patient has been actively warmed in preoperative area. Preoperative antibiotics have been ordered and given within 1 hours of incision. Venous thrombosis prophylaxis have been ordered including bilateral sequential compression devices and chemical prophylaxis    Procedure Details: Statement of medical necessity:  This is a 68 y.o.  male with severe degenerative disease of the knee that has failed non operative management. the risks, benefits and alternatives of total knee arthroplasty were discussed with the patient and they signed informed consent.     Description of procedure:   The patient was brought to the operating room and placed on the operating table in the supine position. All bony prominences were well padded. Appropriate preoperative antibiotics were administered. Anesthesia was induced by the anesthesia team and a time out was performed. The patient was identified by name and medical record number and the laterality and the site of surgery were confirmed by all present.     Under pneumatic tourniquet control, a longitudinal anterior skin incision was made with medial  parapatellar arthrotomy. Hemostasis was obtained with Bovie electrocautery. Comprehensive exposure the knee was carried out for total knee arthroplasty. The patella was subluxed laterally and the knee placed in a flexed position.      Using intramedullary alignment, the distal femur was cut in a 5° valgus position. The appropriate-sized femoral component was selected based upon intraoperative measurements of the sagittal dimension of the femur. The distal femoral cutting guide was placed perpendicular to Whitesides line and parallel with the transepicondylar axis, with  3° of external rotation based upon the posterior condylar axis. The distal femur was then finished to accept a posterior stabilized prosthesis.     Attention was then directed to the proximal tibia. Meniscal remnants were excised. Using extramedullary alignment, the proximal tibia was cut perpendicular to its longitudinal axis with a 3° posterior slope. Osteophytes were excised from the posterior femur, medial and lateral femur, and circumferentially about the tibia to avoid soft tissue impingement. The posterior capsule, medial lateral soft tissue structures were injected using combination of Duramorph, Ropivicaine, toradol and epinephrine.Flexion and extension gaps were assessed.     Flexion and extension gaps were equal and rectangular. No ligamentous release was necessary for appropriate balance.     Trial components were placed. Knee achieved full extension and flexion with excellent varus and valgus stability throughout range of motion. The patella tracked centrally. Tibial component rotation was marked, and the tibia finished in the usual fashion to accept an appropriately sized tibial component.     bone surfaces prepared with pulsatile saline irrigation. Bone quality was assessed and deemed appropriate for press fit implants. Final implants were inserted and impacted into position with excellent stability noted. The final articular  polyethyelne surface was then inserted.      The patella was assessed and its surfaces judged appropriate for non prosthetic patellar arthroplasty. Non prosthetic patellar arthroplasty was then performed, with excision of synovium from the periphery of the patella and circumferential electrocautery and excision of ostephytes.      With the final implants in placed the mechanics of the knee were once again confirmed and noted to be excellent.      Joint was irrigated with dilute betadine solution, followed by normal saline.   The arthrotomy was reapproximated using #1 poly and #2 quill sutures. Subcutaneous layer was closed with 2-0 poly, subcuticular layer with 3-0 v-lock, then perineo mesh and glue dressing was placed followed by mepilex, cast padding, and ace wrap.     Patient was awoken from anesthesia and brought to the pacu in stable position.     Statement of staff presence: I was present and scrubbed for all critical portions of the procedure and was immediately available during closing      Complications:  None; patient tolerated the procedure well.    Disposition: PACU - hemodynamically stable.  Condition: stable         Additional Details: none    Attending Attestation: I was present and scrubbed for the key portions of the procedure.    Desmond Winter  Phone Number: [phone]

## 2024-10-07 NOTE — ANESTHESIA PREPROCEDURE EVALUATION
Patient: Marcio Bower    Procedure Information       Date/Time: 10/07/24 0835    Procedure: TOTAL KNEE REPLACEMENT HYBRID UNILAT (Left: Knee) - depuy attune    Location: [facility] OR 03 / [facility] OR    Surgeons: Desmond Winter MD          Vitals:    10/07/24 0711   BP: 131/86   Pulse: 92   Resp: 16   Temp: 37.1 °C (98.8 °F)   SpO2: 95%       Past Surgical History:   Procedure Laterality Date   • ANKLE FRACTURE SURGERY Right    • COLONOSCOPY  11/21/2019   • DENTAL SURGERY      EXTRACTIONS   • KNEE ARTHROSCOPY W/ MENISCAL REPAIR Left 02/25/2016   • RETINAL DETACHMENT SURGERY Right      Past Medical History:   Diagnosis Date   • Acute bilateral knee pain 07/30/2024   • Arthritis    • Cellulitis     bilateral upper extremities   • Class 1 obesity with body mass index (BMI) of 30.0 to 30.9 in adult 09/16/2024   • Coronary artery disease    • Hyperlipidemia    • Hypertension    • Right retinal detachment     1970's   • Skin irritation 01/11/2024    Chest   • Vitamin D deficiency    • Wears dentures     full upper denture   having 7 lower teeth extracted on 9/18, then will be edentulous       Current Facility-Administered Medications:   •  ceFAZolin (Ancef) 2 g in dextrose (iso)  mL, 2 g, intravenous, Once, Desmond Winter MD  •  lactated Ringer's infusion, 20 mL/hr, intravenous, Continuous, Darrion Parra MD, Last Rate: 20 mL/hr at 10/07/24 0743, 20 mL/hr at 10/07/24 0743  •  sodium chloride 0.9% infusion, 100 mL/hr, intravenous, Continuous, Desmond Winter MD  Prior to Admission medications    Medication Sig Start Date End Date Taking? Authorizing Provider   aspirin 81 mg EC tablet Take 1 tablet (81 mg) by mouth once daily.   Yes Historical Provider, MD   atorvastatin (Lipitor) 20 mg tablet Take 1 tablet (20 mg) by mouth once daily.   Yes Historical Provider, MD   cholecalciferol (Vitamin D-3) 25 MCG (1000 UT) capsule Take 1 capsule (25 mcg) by mouth once daily.   Yes Historical Provider, MD    ibuprofen 200 mg tablet Take 2 tablets (400 mg) by mouth every 8 hours if needed for mild pain (1 - 3).   Yes Historical Provider, MD   meloxicam (Mobic) 15 mg tablet Take 1 tablet (15 mg) by mouth once daily. 7/30/24 11/27/24 Yes Desmond Winter MD   olmesartan (BENIcar) 20 mg tablet Take 1 tablet (20 mg) by mouth once daily.   Yes Historical Provider, MD     No Known Allergies  Social History     Tobacco Use   • Smoking status: Every Day     Current packs/day: 1.00     Types: Cigarettes   • Smokeless tobacco: Never   Substance Use Topics   • Alcohol use: Yes     Comment: occasionally         Chemistry    Lab Results   Component Value Date/Time     (L) 09/16/2024 0917    K 4.3 09/16/2024 0917    CL 97 (L) 09/16/2024 0917    CO2 29 09/16/2024 0917    BUN 16 09/16/2024 0917    CREATININE 0.71 09/16/2024 0917    Lab Results   Component Value Date/Time    CALCIUM 9.6 09/16/2024 0917    ALKPHOS 46 09/16/2024 0917    AST 18 09/16/2024 0917    ALT 16 09/16/2024 0917    BILITOT 0.6 09/16/2024 0917          Lab Results   Component Value Date/Time    WBC 5.3 09/16/2024 0917    HGB 15.9 09/16/2024 0917    HCT 47.3 09/16/2024 0917     09/16/2024 0917     Lab Results   Component Value Date/Time    PROTIME 13.8 (H) 06/06/2018 0605    INR 1.3 (H) 06/06/2018 0605     Encounter Date: 09/16/24   ECG 12 Lead   Result Value    Ventricular Rate 84    Atrial Rate 84    MA Interval 180    QRS Duration 108    QT Interval 376    QTC Calculation(Bazett) 444    P Axis 24    R Axis -20    T Axis 31    QRS Count 14    Q Onset 223    P Onset 133    P Offset 194    T Offset 411    QTC Fredericia 420    Narrative    Sinus rhythm with occasional Premature ventricular complexes  Incomplete right bundle branch block  Borderline ECG  When compared with ECG of 05-JUN-2018 17:34,  Premature ventricular complexes are now Present     No results found for this or any previous visit from the past 1095 days.      Relevant Problems    Anesthesia (within normal limits)      Cardiac   (+) HTN (hypertension)   (+) Hyperlipidemia      Pulmonary (within normal limits)      Neuro (within normal limits)      GI (within normal limits)      /Renal (within normal limits)      Liver (within normal limits)      Endocrine (within normal limits)      Hematology (within normal limits)       Clinical information reviewed:   Tobacco  Allergies  Meds   Med Hx  Surg Hx   Fam Hx  Soc Hx        NPO Detail:  NPO/Void Status  Carbohydrate Drink Given Prior to Surgery? : N  Date of Last Liquid: 10/07/24  Time of Last Liquid: 0530  Date of Last Solid: 10/06/24  Time of Last Solid: 2000  Last Intake Type: Clear fluids  Time of Last Void: 0600         Physical Exam    Airway  Mallampati: II  TM distance: >3 FB     Cardiovascular - normal exam     Dental - normal exam     Pulmonary    Abdominal          Anesthesia Plan    History of general anesthesia?: yes  History of complications of general anesthesia?: no    ASA 2     spinal and regional     The patient is not a current smoker.  Patient was not previously instructed to abstain from smoking on day of procedure.  Patient did not smoke on day of procedure.  Education provided regarding risk of obstructive sleep apnea.  intravenous induction   Anesthetic plan and risks discussed with patient.    Plan discussed with CRNA.

## 2024-10-07 NOTE — PROGRESS NOTES
Physical Therapy    Physical Therapy Evaluation    Patient Name: Marcio Bower  MRN: [id number]  Department:   Room: [facility] OR  Today's Date: 10/7/2024   Time Calculation  Start Time: 1352  Stop Time: 1421  Time Calculation (min): 29 min    Assessment/Plan   PT Assessment  PT Assessment Results: Decreased mobility, Orthopedic restrictions  Rehab Prognosis: Excellent  Barriers to Discharge: none  Evaluation/Treatment Tolerance: Patient tolerated treatment well  Medical Staff Made Aware: Yes  Strengths: Ability to acquire knowledge  End of Session Patient Position:  (Pt sitting up in his wheelchair waiting for his RN to remove his IV pole and then D/C home with his spouse)     PT Plan  PT Eval Only Reason: Safe to return home  PT Discharge Recommendations: Low intensity level of continued care  Equipment Recommended upon Discharge: Wheeled walker  PT Recommended Transfer Status: Independent  PT - OK to Discharge: Yes (LOW follow up services)      Subjective   General Visit Information:  General  Reason for Referral: 67 yo male admitted 2' to L knee OA, s/p L TKA  Referred By: Dr. JANINA Winter  Past Medical History Relevant to Rehab: OA, CAD, HLD, HTN  Family/Caregiver Present: Yes (spouse)  Prior to Session Communication: Bedside nurse  Patient Position Received: Bed, 3 rail up, Alarm off, not on at start of session  Preferred Learning Style: verbal, visual, written  General Comment: Pt is pleasant and agreeable to work with PT. PT went over Pt's TKA precautions and Pt performed his TKA protocol ex's. Pt doesn't need further PT services here as he is D/C home with his spouse today. Recommend LOW follow up services.  Home Living:  Home Living  Type of Home: House  Lives With: Spouse  Home Adaptive Equipment: Walker rolling or standard, Cane  Home Layout: Two level (Pt stays on the 1st floor)  Home Access: Stairs to enter without rails (states that he uses the door frame)  Entrance Stairs-Rails: None  Entrance  Stairs-Number of Steps: 2  Bathroom Shower/Tub: Walk-in shower  Bathroom Equipment: Raised toilet seat without rails  Prior Level of Function:  Prior Function Per Pt/Caregiver Report  Level of [place]: Independent with ADLs and functional transfers, Independent with homemaking with ambulation (no device)  Ambulatory Assistance: Independent  Precautions:  Precautions  LE Weight Bearing Status: Weight Bearing as Tolerated (L LE)  Post-Surgical Precautions: Left total knee precautions    Vital Signs (Past 2hrs)        Date/Time Vitals Session Patient Position Pulse Resp SpO2 BP MAP (mmHg)    10/07/24 1330 --  --  77  15  96 %  118/79  --                        Objective   Pain:  Pain Assessment  Pain Assessment: 0-10  0-10 (Numeric) Pain Score: 0 - No pain  Cognition:  Cognition  Overall Cognitive Status: Within Functional Limits    General Assessments:  General Observation  General Observation: Pt performed the following supine ex's; B AP, L QS, glut sets, L heel slides, L hip abd and L SAQ each x 20 reps               Activity Tolerance  Endurance: Endurance does not limit participation in activity         Strength  Strength Comments: B UE and LE are WFL  Static Sitting Balance  Static Sitting-Balance Support: Bilateral upper extremity supported  Static Sitting-Level of Assistance: Distant supervision    Static Standing Balance  Static Standing-Balance Support: Bilateral upper extremity supported (wheeled waker)  Static Standing-Level of Assistance: Close supervision  Dynamic Standing Balance  Dynamic Standing-Balance Support: Bilateral upper extremity supported (wheeled walker)  Dynamic Standing-Level of Assistance: Close supervision  Functional Assessments:  Bed Mobility  Bed Mobility: Yes  Bed Mobility 1  Bed Mobility 1: Supine to sitting  Level of Assistance 1: Distant supervision    Transfers  Transfer: Yes  Transfer 1  Transfer From 1: Bed to  Transfer to 1: Stand  Technique 1: Sit to stand, Stand to  sit  Transfer Device 1:  (wheeled walker)  Transfer Level of Assistance 1: Close supervision    Ambulation/Gait Training  Ambulation/Gait Training Performed: Yes  Ambulation/Gait Training 1  Surface 1: Level tile  Device 1: Rolling walker  Assistance 1: Close supervision  Quality of Gait 1: Decreased step length (Decreased [place])  Comments/Distance (ft) 1: 100 feet    Stairs  Stairs: Yes  Stairs  Rails 1: Bilateral  Curb Step 1: No  Device 1: No device  Assistance 1: Close supervision  Comment/Number of Steps 1: 3  Extremity/Trunk Assessments:  RUE   RUE : Within Functional Limits  LUE   LUE: Within Functional Limits  RLE   RLE : Within Functional Limits  LLE   LLE : Within Functional Limits  Outcome Measures:  [facility] Basic Mobility  Turning from your back to your side while in a flat bed without using bedrails: None  Moving from lying on your back to sitting on the side of a flat bed without using bedrails: None  Moving to and from bed to chair (including a wheelchair): A little  Standing up from a chair using your arms (e.g. wheelchair or bedside chair): A little  To walk in hospital room: A little  Climbing 3-5 steps with railing: A little  Basic Mobility - Total Score: 20        Education Documentation  Handouts, taught by Anjel Graham PT at 10/7/2024  3:23 PM.  Learner: Patient  Readiness: Eager  Method: Demonstration, Handout  Response: Verbalizes Understanding, Demonstrated Understanding    Precautions, taught by Anjel Graham PT at 10/7/2024  3:23 PM.  Learner: Patient  Readiness: Eager  Method: Demonstration, Handout  Response: Verbalizes Understanding, Demonstrated Understanding    Home Exercise Program, taught by Anjel Graham PT at 10/7/2024  3:23 PM.  Learner: Patient  Readiness: Eager  Method: Demonstration, Handout  Response: Verbalizes Understanding, Demonstrated Understanding    Education Comments  No comments found.

## 2024-10-07 NOTE — NURSING NOTE
Went to PACU to meet with patient and care giver,  I educated them on discharge plans, discussed DME needs, informed them about  home care appointments and answered all questions.  Plan was for patient to discharge same day.  Patient attended class and I reviewed my contact information and told them I would call in a few days to follow up.

## 2024-10-07 NOTE — HH CARE COORDINATION
Home Care received a Referral for Physical Therapy. We have processed the referral for a Start of Care on 10/08.     If you have any questions or concerns, please feel free to contact us at [phone]. Follow the prompts, enter your five digit zip code, and you will be directed to your care team on [facility].

## 2024-10-07 NOTE — ANESTHESIA POSTPROCEDURE EVALUATION
Patient: Marcio Bower    Procedure Summary       Date: 10/07/24 Room / Location: [facility] OR 03 / [facility]    Anesthesia Start: 0918 Anesthesia Stop: 1133    Procedure: TOTAL KNEE REPLACEMENT HYBRID UNILAT (Left: Knee) Diagnosis:       Acute bilateral knee pain      (Acute bilateral knee pain [M25.561, M25.562])    Surgeons: Desmond Winter MD Responsible Provider: Darrion Parra MD    Anesthesia Type: spinal, regional ASA Status: 2            Anesthesia Type: spinal, regional    Vitals Value Taken Time   /58 10/07/24 1131   Temp 36.4 °C (97.5 °F) 10/07/24 1131   Pulse 80 10/07/24 1131   Resp 14 10/07/24 1131   SpO2 99 10/07/24 1139       Anesthesia Post Evaluation    Patient location during evaluation: PACU  Patient participation: complete - patient participated  Level of consciousness: awake  Pain management: adequate  Multimodal analgesia pain management approach  Airway patency: patent  Two or more strategies used to mitigate risk of obstructive sleep apnea  Cardiovascular status: acceptable  Respiratory status: acceptable  Hydration status: acceptable  Postoperative Nausea and Vomiting: none        No notable events documented.

## 2024-10-07 NOTE — ANESTHESIA PROCEDURE NOTES
Spinal Block    Patient location during procedure: OR  Start time: 10/7/2024 9:25 AM  End time: 10/7/2024 9:34 AM  Reason for block: primary anesthetic and at surgeon's request  Staffing  Performed: CRNA and attending   Authorized by: Darrion Parra MD    Performed by: JOSEFA Weaver-CRNA    Preanesthetic Checklist  Completed: patient identified, IV checked, site marked, risks and benefits discussed, surgical consent, monitors and equipment checked, pre-op evaluation, timeout performed and sterile techniques followed  Block Timeout  RN/Licensed healthcare professional reads aloud to the Anesthesia provider and entire team: Patient identity, procedure with side and site, patient position, and as applicable the availability of implants/special equipment/special requirements.    Timeout performed at: 10/7/2024 9:20 AM  Spinal Block  Patient position: sitting  Prep: Betadine  Sterility prep: cap, drape, gloves, hand hygiene and mask  Sedation level: light sedation  Patient monitoring: continuous pulse oximetry, heart rate and blood pressure  Approach: midline  Vertebral space: L2-3  Injection technique: single-shot  Needle  Needle type: pencil-point   Needle gauge: 24 G  Needle length: 4 in  Free flowing CSF: yes    Assessment  Sensory level: T10  Block outcome: patient comfortable  Procedure assessment: patient sedated but conversant throughout procedure  Additional Notes  1st attempt unsuccessful by CRNA.   2nd attempt by Dr. Parra

## 2024-10-07 NOTE — DISCHARGE INSTRUCTIONS
TOTAL HIP AND KNEE REPLACEMENT DISCHARGE INSTRUCTIONS  Desmond Winter MD      DRIVING & TRAVEL AFTER SURGERY   Patients should anticipate waiting at least 4-6 weeks before traveling long distances after surgery.  You will need to stop to walk around ever 1 hour during your travel to help with blood clot prevention.  Please call the office or your joint nurse to discuss prior to post-surgical travel.  Patients may not drive until cleared by the joint nurse or the office.    DENTAL PROCEDURES & CLEANINGS  You must wait a minimum of 3 months for elective dental appointments, including routine cleanings or dental work including bridges, crowns, extractions, etc..  For any dental visit - cleaning or dental procedures - patients must take an antibiotic 1 hour before the appointment.  Antibiotics are a lifelong need before dental appointments.  The antibiotic prescribed will be based on each patient's allergies.    WOUND CARE  If you experience continued drainage or bleeding, you may cover with abdominal pads (purchase at local drug store).  Knee replacements should wrap with an ace wrap.  Do not remove surgical dressing, it will be removed when you arrive in clinic for follow up visit.   Mesh dressing under the bandage will remain in place until it peels off on its own.  If it is loose, you may gently remove.  Do not remove if pulling causes resistance against the incision.      PAIN, SWELLING, BRUISING & CLICKING  Pain and swelling are a natural part of your recovery which is considered normal fur up to a year after surgery.  Symptoms may be treated with movement, ice, compression stockings, elevating your leg, and by following the pain medication regimen as prescribed.  Bruising is normal for several weeks after surgery and will run down the leg over time.  You may ice areas that are tender to help with discomfort.  You are required to wear the provided compression stockings, every day, for 4 weeks following surgery.   Remove the stockings at night and place them back on in the morning.  Pain and swelling may temporarily increase with an increase in activity or exercise.  Use ice after activity.  Audible clicking with movement or exercises is considered normal following joint replacement.  You may also feel decreased sensation or numbness near the incision site.  These are usually normal and may or may not fade over time.     PERSONAL HYGIENE  You may shower upon discharging from the hospital.  Soap and water is permitted to run over the surgical dressing, steri-strips and incision.  Do not scrub directly over these items.  DO NOT soak your incision in a bath, hot tub, pool or pond/lake for a minimum of 8 weeks following your surgery.  DO NOT use lotions, creams, ointments on your wound for a minimum of 6 weeks following your surgery. At that time you may use vitamin E to assist with softening of your incision.      RESTARTING HOME ROUTINE - DIET & MEDICATIONS  Post-operative constipation can result due to a combination of inactivity, anesthesia and pain medication. To help prevent this, you should increase your water and fiber intake. Physical activity such as walking will also help stimulate the bowels.   You may resume your normal diet when you discharge home.  Choose foods that help promote good bowel habits and prevent constipation, such as foods high in fiber.  You may restart your home medications the following day after your surgery UNLESS you have been given alternate instructions.  Follow the instructions given to you on your hospital discharge instructions for more information regarding your home medications.      IN-HOME PHYSICAL THERAPY & OUTPATIENT PHYSICAL THERAPY  In-home physical therapy will start 1-2 days after you get home from the hospital.    The home care agency will call within the first 24-48 hours to set up their first visit.  Please do not call your care team to inquire during this timeframe.  Continue  the exercises you were given in the hospital until you have been seen by in-home therapy.  Make sure to provide a phone number with the ability for the home care staff to leave a message if you do not answer your phone.    Outpatient physical therapy following knee replacement surgery should begin 2-3 weeks after surgery.  You should call to schedule this appointment ASAP if not already scheduled before surgery.  Waiting until you are ready for outpatient physical therapy will cause a delay in your care.  You may choose any outpatient physical therapy location.  Call the office for an order if needed.    EMERGENCIES - WHEN TO CONTACT THE SURGEON'S OFFICE IMMEDIATELY  Fever >101 with chills that has been present for at least 48 hours.   Excessive bleeding from incision that will not slow down. A small amount of drainage is normal and expected.  Once pressure is applied and the area is covered, do not continue to check the area regularly.  This will remove pressure and bleeding will continue.  Leave in place for 4-6 hours.  Signs of infection of incision-excessive drainage that is soaking through your dressing (especially if it is pus-like), redness that is spreading out from the edges of your incision, or increased warmth around the area.  Excruciating pain for which the pain medication, taken as instructed, is not helping.  Severe calf pain.  Go directly to the emergency room or call 911, if you are experiencing chest pain or difficulty breathing.    ICE & COLD THERAPY INSTRUCTIONS    To assist with pain control and post-op swelling, you should be using ice regularly throughout recovery, especially for the first 6 weeks, regardless of the cold therapy method you use.      Always make sure there is a layer of protection between the cold pad and your skin.    If you are using ICE PACKS or GEL PACKS, you will need to alternate 20 minutes on, 20 minutes off twice per hour.    If you are using an ICE MACHINE, please  follow the provided ice machine instructions.  These devices differ from ice or ice packs whereas the mechanism circulates water through tubing and a pad to provide longer periods of cold therapy to the desired site.  You can use your cold devices around the clock for optimal comfort.  We recommend using cold therapy after working with therapy or completing exercises on your own.  There is no set schedule in which you must follow while using cold therapy.  Below are a few points to remember when using a cold therapy device:    You do not need to need to use the 20 on, 20 off method.  Detach the pad from the cooler and ambulate at least once every hour.  You can check your skin under the pad at this time.  You may wear the cold therapy device during periods of sleep including overnight.  If you wake up during the night, you can check the skin at this time.  You do not need to wake up specifically to perform skin checks.  Empty the cooler and pad when device is not in use.  Follow 's instructions for cleaning your cold therapy device.      DISCHARGE MEDICATIONS    PAIN MEDICATION    __X__ Oxycodone-acetaminophen  Oxycodone-acetaminophen has been prescribed for post-operative pain control.    These medications may only be refilled if neededONCE every 7 days for a period of up to 6 weeks following surgery.  After 6 weeks, you will transition to acetaminophen and over -the- counter anti-inflammatories such as Ibuprofen, Advil or Aleve in conjunction with ICE/COLD THERAPY.   Side effects may be constipation and nausea, vomiting, sleepiness, dizziness, lightheadedness, headache, blurred vision, dry mouth sweating, itching (if you have itching, over-the -counter Benadryl can be used as needed).  You may NOT operate a motor vehicle while taking these medications or have been cleared by your care team.    Please call the office for a refill request.  The office staff and orthopedic nurses cannot refill  medications; messages should be left directly through the office.  Please do not call multiple times or call other members of the care team for medication needs, this will cause the refill to take longer.    Per State and Institutional policy, pain medications can only be refilled every 7 days for up to six weeks following surgery.   .    ___X____ Naproxen has been prescribed as an adjunct anti-inflammatory to assist in pain control.    Take one 500 mg tablet twice a day for 4 weeks.  You will not receive refills on this medication.   Side effects may include nausea.  May not be prescribed if you are on a more potent blood thinner than aspirin or have chronic kidney disease.    BLOOD THINNER    __X__ Blood Thinner   A blood thinner has been prescribed to prevent blood clots in your leg or lungs. Take as prescribed on the bottle for 4 weeks. You will not receive a refill on this medication.      STOOL SOFTENERS    __X__ Colace (Docusate Sodium)   Take this medication to help with constipation while using the Oxycodone for pain control.  You will not receive a refill on this medication.  If you have not had a bowel movement for 2 days after surgery if you feel painful constipation, you may try over-the-counter Miralax or a suppository. If you still are not able to have a bowel movement, call our office or discuss with your PCP. If you have preexisting constipation or other GI issues that may predispose you to constipation, please consult with your GI provider or PCP if you have not had a bowel movement for 2 days after surgery.     Antibiotics    ____ Cefadroxil or doxycycline  May be prescribed if needed for prophylaxis against infection   Take 7 day course of antibiotics until they are all gone  May not be prescribed if you do not meet criteria for elevated infection risk after surgery          You will not receive refills on the following medications:    Naproxen  Colace  Blood Thinner

## 2024-10-08 ENCOUNTER — APPOINTMENT (OUTPATIENT)
Dept: RADIOLOGY | Facility: HOSPITAL | Age: 69
End: 2024-10-08
Payer: MEDICARE

## 2024-10-08 ENCOUNTER — HOME CARE VISIT (OUTPATIENT)
Dept: HOME HEALTH SERVICES | Facility: HOME HEALTH | Age: 69
End: 2024-10-08

## 2024-10-08 ENCOUNTER — HOSPITAL ENCOUNTER (EMERGENCY)
Facility: HOSPITAL | Age: 69
Discharge: HOME | End: 2024-10-08
Attending: EMERGENCY MEDICINE
Payer: MEDICARE

## 2024-10-08 ENCOUNTER — APPOINTMENT (OUTPATIENT)
Dept: CARDIOLOGY | Facility: HOSPITAL | Age: 69
End: 2024-10-08
Payer: MEDICARE

## 2024-10-08 VITALS
WEIGHT: 224 LBS | HEART RATE: 81 BPM | BODY MASS INDEX: 30.34 KG/M2 | TEMPERATURE: 97.5 F | DIASTOLIC BLOOD PRESSURE: 78 MMHG | OXYGEN SATURATION: 97 % | SYSTOLIC BLOOD PRESSURE: 110 MMHG | HEIGHT: 72 IN | RESPIRATION RATE: 16 BRPM

## 2024-10-08 DIAGNOSIS — R55 VASOVAGAL ATTACK: Primary | ICD-10-CM

## 2024-10-08 DIAGNOSIS — R55 SYNCOPE, UNSPECIFIED SYNCOPE TYPE: ICD-10-CM

## 2024-10-08 LAB
ALBUMIN SERPL BCP-MCNC: 4 G/DL (ref 3.4–5)
ALP SERPL-CCNC: 42 U/L (ref 33–136)
ALT SERPL W P-5'-P-CCNC: 15 U/L (ref 10–52)
ANION GAP SERPL CALC-SCNC: 11 MMOL/L (ref 10–20)
AST SERPL W P-5'-P-CCNC: 13 U/L (ref 9–39)
BASOPHILS # BLD AUTO: 0.03 X10*3/UL (ref 0–0.1)
BASOPHILS NFR BLD AUTO: 0.2 %
BILIRUB SERPL-MCNC: 0.5 MG/DL (ref 0–1.2)
BUN SERPL-MCNC: 19 MG/DL (ref 6–23)
CALCIUM SERPL-MCNC: 9.3 MG/DL (ref 8.6–10.3)
CARDIAC TROPONIN I PNL SERPL HS: 4 NG/L (ref 0–20)
CHLORIDE SERPL-SCNC: 100 MMOL/L (ref 98–107)
CO2 SERPL-SCNC: 31 MMOL/L (ref 21–32)
CREAT SERPL-MCNC: 0.72 MG/DL (ref 0.5–1.3)
EGFRCR SERPLBLD CKD-EPI 2021: >90 ML/MIN/1.73M*2
EOSINOPHIL # BLD AUTO: 0.06 X10*3/UL (ref 0–0.7)
EOSINOPHIL NFR BLD AUTO: 0.4 %
ERYTHROCYTE [DISTWIDTH] IN BLOOD BY AUTOMATED COUNT: 12.3 % (ref 11.5–14.5)
GLUCOSE SERPL-MCNC: 121 MG/DL (ref 74–99)
HCT VFR BLD AUTO: 44 % (ref 41–52)
HGB BLD-MCNC: 14.7 G/DL (ref 13.5–17.5)
IMM GRANULOCYTES # BLD AUTO: 0.07 X10*3/UL (ref 0–0.7)
IMM GRANULOCYTES NFR BLD AUTO: 0.5 % (ref 0–0.9)
LYMPHOCYTES # BLD AUTO: 3.26 X10*3/UL (ref 1.2–4.8)
LYMPHOCYTES NFR BLD AUTO: 22.6 %
MCH RBC QN AUTO: 29.6 PG (ref 26–34)
MCHC RBC AUTO-ENTMCNC: 33.4 G/DL (ref 32–36)
MCV RBC AUTO: 89 FL (ref 80–100)
MONOCYTES # BLD AUTO: 1.36 X10*3/UL (ref 0.1–1)
MONOCYTES NFR BLD AUTO: 9.4 %
NEUTROPHILS # BLD AUTO: 9.65 X10*3/UL (ref 1.2–7.7)
NEUTROPHILS NFR BLD AUTO: 66.9 %
NRBC BLD-RTO: 0 /100 WBCS (ref 0–0)
PLATELET # BLD AUTO: 269 X10*3/UL (ref 150–450)
POTASSIUM SERPL-SCNC: 4.2 MMOL/L (ref 3.5–5.3)
PROT SERPL-MCNC: 6.6 G/DL (ref 6.4–8.2)
RBC # BLD AUTO: 4.96 X10*6/UL (ref 4.5–5.9)
SODIUM SERPL-SCNC: 138 MMOL/L (ref 136–145)
WBC # BLD AUTO: 14.4 X10*3/UL (ref 4.4–11.3)

## 2024-10-08 PROCEDURE — 2550000001 HC RX 255 CONTRASTS: Performed by: EMERGENCY MEDICINE

## 2024-10-08 PROCEDURE — 2500000004 HC RX 250 GENERAL PHARMACY W/ HCPCS (ALT 636 FOR OP/ED): Performed by: EMERGENCY MEDICINE

## 2024-10-08 PROCEDURE — 93005 ELECTROCARDIOGRAM TRACING: CPT

## 2024-10-08 PROCEDURE — 85025 COMPLETE CBC W/AUTO DIFF WBC: CPT | Performed by: EMERGENCY MEDICINE

## 2024-10-08 PROCEDURE — 84075 ASSAY ALKALINE PHOSPHATASE: CPT | Performed by: EMERGENCY MEDICINE

## 2024-10-08 PROCEDURE — 96375 TX/PRO/DX INJ NEW DRUG ADDON: CPT

## 2024-10-08 PROCEDURE — 71275 CT ANGIOGRAPHY CHEST: CPT | Mod: FOREIGN READ | Performed by: RADIOLOGY

## 2024-10-08 PROCEDURE — 84484 ASSAY OF TROPONIN QUANT: CPT | Performed by: EMERGENCY MEDICINE

## 2024-10-08 PROCEDURE — 36415 COLL VENOUS BLD VENIPUNCTURE: CPT | Performed by: EMERGENCY MEDICINE

## 2024-10-08 PROCEDURE — 99285 EMERGENCY DEPT VISIT HI MDM: CPT

## 2024-10-08 PROCEDURE — 96374 THER/PROPH/DIAG INJ IV PUSH: CPT

## 2024-10-08 PROCEDURE — 71275 CT ANGIOGRAPHY CHEST: CPT

## 2024-10-08 RX ORDER — HYDROMORPHONE HYDROCHLORIDE 1 MG/ML
1 INJECTION, SOLUTION INTRAMUSCULAR; INTRAVENOUS; SUBCUTANEOUS ONCE
Status: COMPLETED | OUTPATIENT
Start: 2024-10-08 | End: 2024-10-08

## 2024-10-08 RX ORDER — ONDANSETRON HYDROCHLORIDE 2 MG/ML
4 INJECTION, SOLUTION INTRAVENOUS ONCE
Status: COMPLETED | OUTPATIENT
Start: 2024-10-08 | End: 2024-10-08

## 2024-10-08 ASSESSMENT — COLUMBIA-SUICIDE SEVERITY RATING SCALE - C-SSRS
2. HAVE YOU ACTUALLY HAD ANY THOUGHTS OF KILLING YOURSELF?: NO
6. HAVE YOU EVER DONE ANYTHING, STARTED TO DO ANYTHING, OR PREPARED TO DO ANYTHING TO END YOUR LIFE?: NO
1. IN THE PAST MONTH, HAVE YOU WISHED YOU WERE DEAD OR WISHED YOU COULD GO TO SLEEP AND NOT WAKE UP?: NO

## 2024-10-08 ASSESSMENT — PAIN SCALES - GENERAL
PAINLEVEL_OUTOF10: 7
PAINLEVEL_OUTOF10: 7

## 2024-10-08 ASSESSMENT — LIFESTYLE VARIABLES
HAVE PEOPLE ANNOYED YOU BY CRITICIZING YOUR DRINKING: NO
TOTAL SCORE: 0
EVER HAD A DRINK FIRST THING IN THE MORNING TO STEADY YOUR NERVES TO GET RID OF A HANGOVER: NO
EVER FELT BAD OR GUILTY ABOUT YOUR DRINKING: NO
HAVE YOU EVER FELT YOU SHOULD CUT DOWN ON YOUR DRINKING: NO

## 2024-10-08 ASSESSMENT — PAIN - FUNCTIONAL ASSESSMENT: PAIN_FUNCTIONAL_ASSESSMENT: 0-10

## 2024-10-08 ASSESSMENT — ACTIVITIES OF DAILY LIVING (ADL): LACK_OF_TRANSPORTATION: NO

## 2024-10-08 NOTE — ED PROVIDER NOTES
HPI   Chief Complaint   Patient presents with    Syncope     Pt presents to ED via EMS from home for near syncope.  Pt states he had a left total knee replacement yesterday here at [facility], he states he woke up this morning at 3am to take his pain medication, shortly after he got up to make a cup of coffee and felt sweaty, weak, confused, and thought he was going to pass out.  Pt states the symptoms have resolved as of now.  Pt reports left knee pain 7/10.  Pt denies chest pain or shortness of breath.       Marcio is a 68-year-old man who received a knee replacement surgery yesterday with Dr. Winter.  He was discharged around 3 PM and went home.  He is been taking it easy and doing okay all night this morning he got up at 3 AM and took a pain pill and went to the bathroom.  At 6 AM he got up and made some coffee and around 630 he started feeling lightheaded while standing and then sat down and passed out.  His wife reports he was joshi and sweating at that time.  He did not fall or hit his head.  He did slump over in the chair.  He denies any nausea vomiting chest pain or shortness of breath.  He does report his knee has been hurting him more lately.  He says he is due for another pain pill.  He has history of hypertension high cholesterol is never passed out before.  He denies any fever chills cough or cold.   History is from EMS per nurses and patient and spouse.  EMR also reviewed.              Patient History   Past Medical History:   Diagnosis Date    Acute bilateral knee pain 07/30/2024    Arthritis     Cellulitis     bilateral upper extremities    Class 1 obesity with body mass index (BMI) of 30.0 to 30.9 in adult 09/16/2024    Coronary artery disease     Hyperlipidemia     Hypertension     Right retinal detachment     1970's    Skin irritation 01/11/2024    Chest    Vitamin D deficiency     Wears dentures     full upper denture   having 7 lower teeth extracted on 9/18, then will be edentulous     Past Surgical  History:   Procedure Laterality Date    ANKLE FRACTURE SURGERY Right     COLONOSCOPY  11/21/2019    DENTAL SURGERY      EXTRACTIONS    KNEE ARTHROSCOPY W/ MENISCAL REPAIR Left 02/25/2016    RETINAL DETACHMENT SURGERY Right      Family History   Problem Relation Name Age of Onset    Hypertension Mother      Breast cancer Mother      Coronary artery disease Father      Lung cancer Sister      Acute myelogenous leukemia Brother       Social History     Tobacco Use    Smoking status: Every Day     Current packs/day: 1.00     Types: Cigarettes    Smokeless tobacco: Never   Substance Use Topics    Alcohol use: Yes     Comment: occasionally    Drug use: Never       Physical Exam   ED Triage Vitals [10/08/24 0725]   Temperature Heart Rate Respirations BP   36.2 °C (97.2 °F) 88 16 102/72      Pulse Ox Temp Source Heart Rate Source Patient Position   97 % Skin Monitor --      BP Location FiO2 (%)     -- --       Physical Exam  Vitals reviewed.   Constitutional:       General: He is awake.      Appearance: Normal appearance.   HENT:      Head: Normocephalic.      Nose: Nose normal.   Cardiovascular:      Rate and Rhythm: Normal rate and regular rhythm.   Pulmonary:      Effort: Pulmonary effort is normal.      Breath sounds: Normal breath sounds.   Abdominal:      Palpations: Abdomen is soft.   Musculoskeletal:      Cervical back: Normal range of motion.      Comments: ETHAN hose on the right leg.  Left leg has an Ace bandage and dressing underneath.  Plan is to workup for possible PE.  Will also evaluate cardiac etiology.  I suspect this is more related to pain   Skin:     General: Skin is warm.      Capillary Refill: Capillary refill takes less than 2 seconds.   Neurological:      Mental Status: He is alert.           ED Course & MDM   ED Course as of 10/08/24 1122   e Oct 08, 2024   0833 EKG done at 716 interpreted by me shows normal sinus rhythm at 80 beats minute with incomplete right bundle no obvious ischemia similar to  previous EKG from September 16, 2024 the bundle was there previously as well.  No STEMI [RZ]      ED Course User Index  [RZ] Scotty Galindo MD         Diagnoses as of 10/08/24 1122   Vasovagal attack   Syncope, unspecified syncope type                 No data recorded     Valentine Coma Scale Score: 15 (10/08/24 0725 : Angela Jack, SARA)                           Medical Decision Making  Patient worked up with CT scan to evaluate for possible PE.  Will also evaluate cardiac etiology.  Patient be treated with some pain control for his leg pain.    I talked to Dr. Winter and he feels patient is able to go home if he is feeling better.  Talk to the patient shared decision-making was employed.  We will attempt to ambulate here in the ED.  I suspect this more of a vasovagal episode.  He is stable be discharged home if is able to walk here with his walker.  Patient understands return occasions and follow-up instructions.  Dr. Winter offered to see him earlier and explained this to the patient.        Procedure  Procedures     Scotty Galindo MD  10/08/24 1122

## 2024-10-08 NOTE — PROGRESS NOTES
10/08/24 1010   Discharge Planning   Living Arrangements Spouse/significant other   Support Systems Spouse/significant other   Assistance Needed A&OX4; independent with ADLs with walker recent secondary to Left Total Knee arthroplasty; drives; room air baseline and currently room air   Type of Residence Private residence   Number of Stairs to Enter Residence 2   Number of Stairs Within Residence 14   Do you have animals or pets at home? No   Who is requesting discharge planning? Provider   Home or Post Acute Services In home services   Expected Discharge Disposition Home H   Does the patient need discharge transport arranged? No   Financial Resource Strain   How hard is it for you to pay for the very basics like food, housing, medical care, and heating? Not hard   Housing Stability   In the last 12 months, was there a time when you were not able to pay the mortgage or rent on time? N   In the past 12 months, how many times have you moved where you were living? 1   At any time in the past 12 months, were you homeless or living in a shelter (including now)? N   Transportation Needs   In the past 12 months, has lack of transportation kept you from medical appointments or from getting medications? no   In the past 12 months, has lack of transportation kept you from meetings, work, or from getting things needed for daily living? No     10/08/2024 1011am  Spoke with patient bedside in ED

## 2024-10-08 NOTE — ED TRIAGE NOTES
Pt presents to ED via EMS from home for near syncope.  Pt states he had a left total knee replacement yesterday here at [facility], he states he woke up this morning at 3am to take his pain medication, shortly after he got up to make a cup of coffee and felt sweaty, weak, confused, and thought he was going to pass out.  Pt states the symptoms have resolved as of now.  Pt reports left knee pain 7/10.  Pt denies chest pain or shortness of breath.

## 2024-10-09 ENCOUNTER — HOME CARE VISIT (OUTPATIENT)
Dept: HOME HEALTH SERVICES | Facility: HOME HEALTH | Age: 69
End: 2024-10-09
Payer: MEDICARE

## 2024-10-09 VITALS
DIASTOLIC BLOOD PRESSURE: 80 MMHG | OXYGEN SATURATION: 99 % | SYSTOLIC BLOOD PRESSURE: 135 MMHG | RESPIRATION RATE: 16 BRPM | HEART RATE: 113 BPM

## 2024-10-09 PROCEDURE — G0151 HHCP-SERV OF PT,EA 15 MIN: HCPCS | Mod: HHH

## 2024-10-09 PROCEDURE — 169592 NO-PAY CLAIM PROCEDURE

## 2024-10-09 SDOH — ECONOMIC STABILITY: HOUSING INSECURITY
HOME SAFETY: UH BOOKLET REVIEWED. CONSENT SIGNED. EEP REVIEWED.PT SHOWN CODE TO SCAN FOR HOME CARE NATIONAL PATIENT SAFETY GOALS ON FRONT OF UHHC FOLDER.

## 2024-10-09 SDOH — HEALTH STABILITY: PHYSICAL HEALTH
EXERCISE COMMENTS: INITIATED TKA PROTOCOL EX AND STRETCHES.   MARCH OPERATED LEG WITH WALKER AMBULATION FOR GRAVITY ASSISTED KNEE FLEXION AND PROMOTE HIP FLEXOR STRENGTH.  STEP LUNGE STRETCH INITIATED

## 2024-10-09 ASSESSMENT — ACTIVITIES OF DAILY LIVING (ADL)
AMBULATION_DISTANCE/DURATION_TOLERATED: 30FT
ENTERING_EXITING_HOME: SUPERVISION
AMBULATION ASSISTANCE: CONTACT GUARD ASSIST
AMBULATION ASSISTANCE: 1
PHYSICAL TRANSFERS ASSESSED: 1
AMBULATION ASSISTANCE ON FLAT SURFACES: 1
OASIS_M1830: 05
CURRENT_FUNCTION: CONTACT GUARD ASSIST

## 2024-10-09 ASSESSMENT — ENCOUNTER SYMPTOMS
SUBJECTIVE PAIN PROGRESSION: WAXING AND WANING
PAIN LOCATION: LEFT KNEE
PAIN: 1
PERSON REPORTING PAIN: PATIENT

## 2024-10-15 ENCOUNTER — HOME CARE VISIT (OUTPATIENT)
Dept: HOME HEALTH SERVICES | Facility: HOME HEALTH | Age: 69
End: 2024-10-15
Payer: MEDICARE

## 2024-10-15 VITALS
OXYGEN SATURATION: 96 % | DIASTOLIC BLOOD PRESSURE: 82 MMHG | TEMPERATURE: 98.2 F | RESPIRATION RATE: 18 BRPM | SYSTOLIC BLOOD PRESSURE: 134 MMHG | HEART RATE: 88 BPM

## 2024-10-15 PROCEDURE — G0151 HHCP-SERV OF PT,EA 15 MIN: HCPCS | Mod: HHH

## 2024-10-15 SDOH — HEALTH STABILITY: PHYSICAL HEALTH: EXERCISE ACTIVITIES SETS: 1

## 2024-10-15 SDOH — HEALTH STABILITY: PHYSICAL HEALTH: EXERCISE ACTIVITY: QUAD SETS

## 2024-10-15 SDOH — HEALTH STABILITY: PHYSICAL HEALTH: PHYSICAL EXERCISE: SUPINE

## 2024-10-15 SDOH — HEALTH STABILITY: PHYSICAL HEALTH

## 2024-10-15 SDOH — HEALTH STABILITY: PHYSICAL HEALTH: PHYSICAL EXERCISE: 12

## 2024-10-15 SDOH — HEALTH STABILITY: PHYSICAL HEALTH: EXERCISE ACTIVITY: TKES

## 2024-10-15 SDOH — HEALTH STABILITY: PHYSICAL HEALTH: EXERCISE ACTIVITY: HEEL SLIDES

## 2024-10-15 SDOH — HEALTH STABILITY: PHYSICAL HEALTH: EXERCISE COMMENTS: PT. REQUIRED INSTR. FOR PROPER PACE AND FULL AVAILABLE ROM FOR EACH EXERCISE.

## 2024-10-15 ASSESSMENT — ENCOUNTER SYMPTOMS
SUBJECTIVE PAIN PROGRESSION: GRADUALLY IMPROVING
MUSCLE WEAKNESS: 1
PAIN LOCATION: LEFT KNEE
PAIN: 1
LOWEST PAIN SEVERITY IN PAST 24 HOURS: 4/10
PAIN SEVERITY GOAL: 3/10
OCCASIONAL FEELINGS OF UNSTEADINESS: 0
PERSON REPORTING PAIN: PATIENT
HIGHEST PAIN SEVERITY IN PAST 24 HOURS: 6/10

## 2024-10-15 ASSESSMENT — ACTIVITIES OF DAILY LIVING (ADL)
CURRENT_FUNCTION: ONE PERSON
AMBULATION ASSISTANCE ON FLAT SURFACES: 1
AMBULATION ASSISTANCE: ONE PERSON
CURRENT_FUNCTION: STAND BY ASSIST
PHYSICAL_TRANSFERS_DEVICES: WHEELED WALKER
AMBULATION ASSISTANCE: STAND BY ASSIST
AMBULATION ASSISTANCE: 1
PHYSICAL TRANSFERS ASSESSED: 1
AMBULATION_DISTANCE/DURATION_TOLERATED: 125 FEET X 2

## 2024-10-16 LAB
LABORATORY COMMENT REPORT: NORMAL
PATH REPORT.FINAL DX SPEC: NORMAL
PATH REPORT.GROSS SPEC: NORMAL
PATH REPORT.RELEVANT HX SPEC: NORMAL
PATH REPORT.TOTAL CANCER: NORMAL

## 2024-10-17 ENCOUNTER — HOME CARE VISIT (OUTPATIENT)
Dept: HOME HEALTH SERVICES | Facility: HOME HEALTH | Age: 69
End: 2024-10-17
Payer: MEDICARE

## 2024-10-17 VITALS
SYSTOLIC BLOOD PRESSURE: 128 MMHG | HEART RATE: 84 BPM | TEMPERATURE: 98 F | OXYGEN SATURATION: 95 % | DIASTOLIC BLOOD PRESSURE: 84 MMHG | RESPIRATION RATE: 18 BRPM

## 2024-10-17 PROCEDURE — G0151 HHCP-SERV OF PT,EA 15 MIN: HCPCS | Mod: HHH

## 2024-10-17 SDOH — HEALTH STABILITY: PHYSICAL HEALTH
EXERCISE COMMENTS: L TKA PROTOCOL EXERCISES IN SUPINE AND SITTING POS., 10 TO 15 REPS X 1 SET EA EXER. WITH INSTR. FOR PROPER PACE AND FULL AVAILABLE ROM FOR EACH EXERCISE.

## 2024-10-17 ASSESSMENT — ENCOUNTER SYMPTOMS
PAIN LOCATION: LEFT KNEE
PERSON REPORTING PAIN: PATIENT
PAIN SEVERITY GOAL: 3/10
OCCASIONAL FEELINGS OF UNSTEADINESS: 0
LOWEST PAIN SEVERITY IN PAST 24 HOURS: 3/10
HIGHEST PAIN SEVERITY IN PAST 24 HOURS: 5/10
MUSCLE WEAKNESS: 1
SUBJECTIVE PAIN PROGRESSION: GRADUALLY IMPROVING
PAIN: 1

## 2024-10-17 ASSESSMENT — ACTIVITIES OF DAILY LIVING (ADL)
PHYSICAL_TRANSFERS_DEVICES: WHEELED WALKER
CURRENT_FUNCTION: CONTACT GUARD ASSIST
AMBULATION ASSISTANCE: ONE PERSON
PHYSICAL TRANSFERS ASSESSED: 1
AMBULATION_DISTANCE/DURATION_TOLERATED: 100 FEET X 2
CURRENT_FUNCTION: STAND BY ASSIST
CURRENT_FUNCTION: ONE PERSON
AMBULATION ASSISTANCE: 1
AMBULATION ASSISTANCE ON FLAT SURFACES: 1
AMBULATION ASSISTANCE: STAND BY ASSIST

## 2024-10-22 ENCOUNTER — HOME CARE VISIT (OUTPATIENT)
Dept: HOME HEALTH SERVICES | Facility: HOME HEALTH | Age: 69
End: 2024-10-22
Payer: MEDICARE

## 2024-10-22 ENCOUNTER — APPOINTMENT (OUTPATIENT)
Dept: ORTHOPEDIC SURGERY | Facility: CLINIC | Age: 69
End: 2024-10-22
Payer: MEDICARE

## 2024-10-22 ENCOUNTER — HOSPITAL ENCOUNTER (OUTPATIENT)
Dept: RADIOLOGY | Facility: HOSPITAL | Age: 69
Discharge: HOME | End: 2024-10-22
Payer: MEDICARE

## 2024-10-22 VITALS
OXYGEN SATURATION: 96 % | SYSTOLIC BLOOD PRESSURE: 126 MMHG | DIASTOLIC BLOOD PRESSURE: 82 MMHG | TEMPERATURE: 98.2 F | HEART RATE: 88 BPM | RESPIRATION RATE: 18 BRPM

## 2024-10-22 VITALS — HEIGHT: 72 IN | BODY MASS INDEX: 30.34 KG/M2 | WEIGHT: 224 LBS

## 2024-10-22 DIAGNOSIS — M25.562 ACUTE PAIN OF LEFT KNEE: Primary | ICD-10-CM

## 2024-10-22 DIAGNOSIS — M25.562 ACUTE PAIN OF LEFT KNEE: ICD-10-CM

## 2024-10-22 DIAGNOSIS — Z96.652 S/P TKR (TOTAL KNEE REPLACEMENT), LEFT: ICD-10-CM

## 2024-10-22 PROCEDURE — 1160F RVW MEDS BY RX/DR IN RCRD: CPT

## 2024-10-22 PROCEDURE — 1159F MED LIST DOCD IN RCRD: CPT

## 2024-10-22 PROCEDURE — G0151 HHCP-SERV OF PT,EA 15 MIN: HCPCS | Mod: HHH

## 2024-10-22 PROCEDURE — 3008F BODY MASS INDEX DOCD: CPT

## 2024-10-22 PROCEDURE — 1125F AMNT PAIN NOTED PAIN PRSNT: CPT

## 2024-10-22 PROCEDURE — 73564 X-RAY EXAM KNEE 4 OR MORE: CPT | Mod: LT

## 2024-10-22 PROCEDURE — 99024 POSTOP FOLLOW-UP VISIT: CPT

## 2024-10-22 RX ORDER — OXYCODONE AND ACETAMINOPHEN 5; 325 MG/1; MG/1
1 TABLET ORAL EVERY 4 HOURS PRN
Qty: 36 TABLET | Refills: 0 | Status: SHIPPED | OUTPATIENT
Start: 2024-10-22

## 2024-10-22 RX ORDER — DOXYCYCLINE 100 MG/1
100 CAPSULE ORAL 2 TIMES DAILY
Qty: 20 CAPSULE | Refills: 0 | Status: SHIPPED | OUTPATIENT
Start: 2024-10-22 | End: 2024-11-01

## 2024-10-22 SDOH — HEALTH STABILITY: PHYSICAL HEALTH
EXERCISE COMMENTS: L TKA PROTOCOL EXERCISES INSUPINE AND SITTING POS., 10 TO 15 REPS X 1 SET EA EXER. WITH INSTR. FOR PROPER PACE AND FULL AVAILABLE ROM FOR EACH EXER. .

## 2024-10-22 ASSESSMENT — ACTIVITIES OF DAILY LIVING (ADL)
CURRENT_FUNCTION: STAND BY ASSIST
CURRENT_FUNCTION: ONE PERSON
PHYSICAL_TRANSFERS_DEVICES: WHEELED WALKER
AMBULATION_DISTANCE/DURATION_TOLERATED: 175 FEET X 2
AMBULATION ASSISTANCE: STAND BY ASSIST
AMBULATION ASSISTANCE: 1
PHYSICAL TRANSFERS ASSESSED: 1
AMBULATION ASSISTANCE ON FLAT SURFACES: 1
CURRENT_FUNCTION: CONTACT GUARD ASSIST
AMBULATION ASSISTANCE: ONE PERSON

## 2024-10-22 ASSESSMENT — ENCOUNTER SYMPTOMS
HIGHEST PAIN SEVERITY IN PAST 24 HOURS: 8/10
PAIN SEVERITY GOAL: 3/10
MUSCLE WEAKNESS: 1
OCCASIONAL FEELINGS OF UNSTEADINESS: 0
SUBJECTIVE PAIN PROGRESSION: WAXING AND WANING
PAIN: 1
LOWEST PAIN SEVERITY IN PAST 24 HOURS: 5/10
PAIN LOCATION: LEFT KNEE
PERSON REPORTING PAIN: PATIENT

## 2024-10-22 ASSESSMENT — PAIN - FUNCTIONAL ASSESSMENT: PAIN_FUNCTIONAL_ASSESSMENT: 0-10

## 2024-10-22 ASSESSMENT — PAIN DESCRIPTION - DESCRIPTORS: DESCRIPTORS: ACHING;SORE

## 2024-10-22 ASSESSMENT — PAIN SCALES - GENERAL: PAINLEVEL_OUTOF10: 3

## 2024-10-22 NOTE — PROGRESS NOTES
Chief Complaint   Patient presents with    Left Knee - Post-op     10/7/24 LT TKA         This is a 68 y.o. male who is 2 weeks out from left total knee.  Patient states that he stopped taking his oxycodone after the first week and now complains that the pain is pretty severe and his other medications is not covering it.  He states he has a family history of opioid abuse and is worried about this.  There was a very small amount of drainage from the inferior corner of his incision, no fevers or chills.  Progressing well with physical therapy and appropriately improving in function.  No other new issues or symptoms.    Left knee examination: Surgical incision well approximated no erythema small amount of white drainage from the inferior corner of his incision.  Stable to varus valgus stress range of motion 10 to 110 degrees extension flexion.  Neurovascular tact distally    X-rays of the knee were reviewed independently interpreted by me today, the show stable total knee arthroplasty no fracture dislocation or loosening    Impression plan: 68 y.o. male 2 weeks out from left total knee.  We discussed continuing physical therapy and home exercise program. Pain medications to be used as needed. Assistive devices as needed per PT. We discussed DVT prophylaxis until 6 weeks. No dentist until 3 months and thereafter dental prophylaxis for life. Activity as tolerated weightbearing as tolerated. I have personally reviewed the OARRS report for the patient. This report is scanned into the electronic medical record. I have considered the risks of abuse, dependence, addiction and diversion. Follow up 4 weeks with xrays.  I have placed a round of antibiotics for this patient to take for precautionary measures as his incision is still healing.  I told him if the drainage gets worse and if he develops fevers or chills to return to the office sooner than 4 weeks.  I have prescribed another round of pain medication and told him he is  okay take this until 6 weeks.  I have also placed an order for outpatient PT.

## 2024-10-24 ENCOUNTER — HOME CARE VISIT (OUTPATIENT)
Dept: HOME HEALTH SERVICES | Facility: HOME HEALTH | Age: 69
End: 2024-10-24
Payer: MEDICARE

## 2024-10-24 VITALS
TEMPERATURE: 98.8 F | SYSTOLIC BLOOD PRESSURE: 128 MMHG | DIASTOLIC BLOOD PRESSURE: 84 MMHG | RESPIRATION RATE: 18 BRPM | OXYGEN SATURATION: 98 % | HEART RATE: 92 BPM

## 2024-10-24 LAB
ATRIAL RATE: 80 BPM
P AXIS: 68 DEGREES
P OFFSET: 195 MS
P ONSET: 134 MS
PR INTERVAL: 178 MS
Q ONSET: 223 MS
QRS COUNT: 14 BEATS
QRS DURATION: 114 MS
QT INTERVAL: 376 MS
QTC CALCULATION(BAZETT): 433 MS
QTC FREDERICIA: 414 MS
R AXIS: 34 DEGREES
T AXIS: 52 DEGREES
T OFFSET: 411 MS
VENTRICULAR RATE: 80 BPM

## 2024-10-24 PROCEDURE — G0151 HHCP-SERV OF PT,EA 15 MIN: HCPCS | Mod: HHH

## 2024-10-24 SDOH — HEALTH STABILITY: PHYSICAL HEALTH
EXERCISE COMMENTS: L LE TKA PROTOCOL EXERCISES IN SUPINE, SITTING AND SUPINE POS., 10 TO 15 REPS X 1 SET EA EXER. WITH INSTR. FOR PROPER PACE AND FULL AVAILABLE ROM FOR EACH EXERCISE.

## 2024-10-24 ASSESSMENT — ACTIVITIES OF DAILY LIVING (ADL)
AMBULATION_DISTANCE/DURATION_TOLERATED: 175
CURRENT_FUNCTION: STAND BY ASSIST
PHYSICAL TRANSFERS ASSESSED: 1
PHYSICAL_TRANSFERS_DEVICES: SBQC
AMBULATION ASSISTANCE: ONE PERSON
AMBULATION ASSISTANCE ON FLAT SURFACES: 1
CURRENT_FUNCTION: ONE PERSON
AMBULATION ASSISTANCE: SUPERVISION
AMBULATION ASSISTANCE: 1

## 2024-10-24 ASSESSMENT — ENCOUNTER SYMPTOMS
MUSCLE WEAKNESS: 1
PAIN: 1
OCCASIONAL FEELINGS OF UNSTEADINESS: 0
HIGHEST PAIN SEVERITY IN PAST 24 HOURS: 3/10
LOWEST PAIN SEVERITY IN PAST 24 HOURS: 2/10
PAIN SEVERITY GOAL: 3/10
PAIN LOCATION: LEFT KNEE
SUBJECTIVE PAIN PROGRESSION: GRADUALLY IMPROVING

## 2024-10-29 ENCOUNTER — HOME CARE VISIT (OUTPATIENT)
Dept: HOME HEALTH SERVICES | Facility: HOME HEALTH | Age: 69
End: 2024-10-29
Payer: MEDICARE

## 2024-10-29 VITALS
TEMPERATURE: 99 F | HEART RATE: 88 BPM | SYSTOLIC BLOOD PRESSURE: 134 MMHG | RESPIRATION RATE: 18 BRPM | OXYGEN SATURATION: 93 % | DIASTOLIC BLOOD PRESSURE: 82 MMHG

## 2024-10-29 PROCEDURE — G0151 HHCP-SERV OF PT,EA 15 MIN: HCPCS | Mod: HHH

## 2024-10-29 SDOH — HEALTH STABILITY: PHYSICAL HEALTH
EXERCISE COMMENTS: L LE TKA PROTOCOL EXER.S IN SUPINE, SITTING AND STANDING POS., 10 TO 15 REPS X 1 SET EA EXER. WITH INSTR. FOR PROPER PACE AND FULL AVAILABLE ROM FOR EACH EXERCISE.

## 2024-10-29 ASSESSMENT — ENCOUNTER SYMPTOMS
LOWEST PAIN SEVERITY IN PAST 24 HOURS: 3/10
HIGHEST PAIN SEVERITY IN PAST 24 HOURS: 7/10
PERSON REPORTING PAIN: PATIENT
MUSCLE WEAKNESS: 1
SUBJECTIVE PAIN PROGRESSION: GRADUALLY IMPROVING
PAIN LOCATION: LEFT KNEE
PAIN SEVERITY GOAL: 3/10
PAIN: 1
OCCASIONAL FEELINGS OF UNSTEADINESS: 0

## 2024-10-29 ASSESSMENT — ACTIVITIES OF DAILY LIVING (ADL)
AMBULATION ASSISTANCE: ONE PERSON
PHYSICAL_TRANSFERS_DEVICES: STRAIGHT CANE
CURRENT_FUNCTION: ONE PERSON
AMBULATION_DISTANCE/DURATION_TOLERATED: 175 FEET X 2
AMBULATION ASSISTANCE ON FLAT SURFACES: 1
AMBULATION ASSISTANCE: 1
PHYSICAL TRANSFERS ASSESSED: 1
CURRENT_FUNCTION: STAND BY ASSIST
AMBULATION ASSISTANCE: STAND BY ASSIST

## 2024-10-31 ENCOUNTER — TELEPHONE (OUTPATIENT)
Dept: ORTHOPEDIC SURGERY | Facility: CLINIC | Age: 69
End: 2024-10-31
Payer: MEDICARE

## 2024-10-31 ENCOUNTER — HOME CARE VISIT (OUTPATIENT)
Dept: HOME HEALTH SERVICES | Facility: HOME HEALTH | Age: 69
End: 2024-10-31
Payer: MEDICARE

## 2024-10-31 VITALS
HEART RATE: 88 BPM | RESPIRATION RATE: 18 BRPM | TEMPERATURE: 98.2 F | SYSTOLIC BLOOD PRESSURE: 130 MMHG | DIASTOLIC BLOOD PRESSURE: 88 MMHG | OXYGEN SATURATION: 96 %

## 2024-10-31 PROCEDURE — G0151 HHCP-SERV OF PT,EA 15 MIN: HCPCS | Mod: HHH

## 2024-10-31 ASSESSMENT — ENCOUNTER SYMPTOMS
HIGHEST PAIN SEVERITY IN PAST 24 HOURS: 7/10
OCCASIONAL FEELINGS OF UNSTEADINESS: 0
PAIN: 1
PERSON REPORTING PAIN: PATIENT
MUSCLE WEAKNESS: 1
SUBJECTIVE PAIN PROGRESSION: GRADUALLY IMPROVING
NAUSEA: 1
PAIN LOCATION: LEFT KNEE
LOWEST PAIN SEVERITY IN PAST 24 HOURS: 3/10
PAIN SEVERITY GOAL: 3/10

## 2024-10-31 ASSESSMENT — ACTIVITIES OF DAILY LIVING (ADL)
AMBULATION ASSISTANCE ON FLAT SURFACES: 1
AMBULATION ASSISTANCE: ONE PERSON
AMBULATION_DISTANCE/DURATION_TOLERATED: 225 FEET X 2
PHYSICAL_TRANSFERS_DEVICES: STRAIGHT CANE
AMBULATION ASSISTANCE: SUPERVISION
PHYSICAL TRANSFERS ASSESSED: 1
CURRENT_FUNCTION: ONE PERSON
AMBULATION ASSISTANCE: 1
CURRENT_FUNCTION: SUPERVISION

## 2024-11-01 ENCOUNTER — TELEPHONE (OUTPATIENT)
Dept: ORTHOPEDIC SURGERY | Facility: CLINIC | Age: 69
End: 2024-11-01
Payer: MEDICARE

## 2024-11-01 DIAGNOSIS — Z96.652 S/P TKR (TOTAL KNEE REPLACEMENT), LEFT: ICD-10-CM

## 2024-11-01 RX ORDER — OXYCODONE AND ACETAMINOPHEN 5; 325 MG/1; MG/1
1 TABLET ORAL EVERY 4 HOURS PRN
Qty: 36 TABLET | Refills: 0 | Status: SHIPPED | OUTPATIENT
Start: 2024-11-01

## 2024-11-05 ENCOUNTER — HOME CARE VISIT (OUTPATIENT)
Dept: HOME HEALTH SERVICES | Facility: HOME HEALTH | Age: 69
End: 2024-11-05
Payer: MEDICARE

## 2024-11-05 VITALS
TEMPERATURE: 98.2 F | DIASTOLIC BLOOD PRESSURE: 90 MMHG | HEART RATE: 88 BPM | RESPIRATION RATE: 18 BRPM | SYSTOLIC BLOOD PRESSURE: 134 MMHG | OXYGEN SATURATION: 94 %

## 2024-11-05 PROCEDURE — G0151 HHCP-SERV OF PT,EA 15 MIN: HCPCS | Mod: HHH

## 2024-11-05 SDOH — HEALTH STABILITY: PHYSICAL HEALTH
EXERCISE COMMENTS: L LE TKA PROTOCOL EXER.S, 15 REPS X 1 SET EA EXER. WITH INSTR. FOR PROPER PACE AND FULL AVAILABLE ROM FOR EACH EXERCISE.

## 2024-11-05 ASSESSMENT — ACTIVITIES OF DAILY LIVING (ADL)
CURRENT_FUNCTION: ONE PERSON
AMBULATION ASSISTANCE ON FLAT SURFACES: 1
AMBULATION_DISTANCE/DURATION_TOLERATED: 225 FEET X 2
AMBULATION ASSISTANCE: ONE PERSON

## 2024-11-05 ASSESSMENT — ENCOUNTER SYMPTOMS
PAIN SEVERITY GOAL: 3/10
SUBJECTIVE PAIN PROGRESSION: GRADUALLY IMPROVING
PAIN LOCATION: LEFT KNEE
LOWEST PAIN SEVERITY IN PAST 24 HOURS: 3/10
OCCASIONAL FEELINGS OF UNSTEADINESS: 0
HIGHEST PAIN SEVERITY IN PAST 24 HOURS: 6/10
PERSON REPORTING PAIN: PATIENT
PAIN: 1
MUSCLE WEAKNESS: 1

## 2024-11-07 ENCOUNTER — HOME CARE VISIT (OUTPATIENT)
Dept: HOME HEALTH SERVICES | Facility: HOME HEALTH | Age: 69
End: 2024-11-07
Payer: MEDICARE

## 2024-11-07 VITALS
RESPIRATION RATE: 18 BRPM | SYSTOLIC BLOOD PRESSURE: 132 MMHG | TEMPERATURE: 98 F | OXYGEN SATURATION: 98 % | DIASTOLIC BLOOD PRESSURE: 80 MMHG | HEART RATE: 72 BPM

## 2024-11-07 PROCEDURE — G0151 HHCP-SERV OF PT,EA 15 MIN: HCPCS | Mod: HHH

## 2024-11-07 ASSESSMENT — ENCOUNTER SYMPTOMS
LOWEST PAIN SEVERITY IN PAST 24 HOURS: 2/10
OCCASIONAL FEELINGS OF UNSTEADINESS: 0
PAIN LOCATION: LEFT KNEE
PERSON REPORTING PAIN: PATIENT
HIGHEST PAIN SEVERITY IN PAST 24 HOURS: 5/10
SUBJECTIVE PAIN PROGRESSION: GRADUALLY IMPROVING
PAIN SEVERITY GOAL: 3/10
PAIN: 1

## 2024-11-07 ASSESSMENT — ACTIVITIES OF DAILY LIVING (ADL)
HOME_HEALTH_OASIS: 00
OASIS_M1830: 01
ADLS_COMMENTS: PT. INDEP. WITH ADLS AND IADLS AT THIS TIME.

## 2024-11-18 LAB
ATRIAL RATE: 84 BPM
P AXIS: 24 DEGREES
P OFFSET: 194 MS
P ONSET: 133 MS
PR INTERVAL: 180 MS
Q ONSET: 223 MS
QRS COUNT: 14 BEATS
QRS DURATION: 108 MS
QT INTERVAL: 376 MS
QTC CALCULATION(BAZETT): 444 MS
QTC FREDERICIA: 420 MS
R AXIS: -20 DEGREES
T AXIS: 31 DEGREES
T OFFSET: 411 MS
VENTRICULAR RATE: 84 BPM

## 2024-11-26 ENCOUNTER — APPOINTMENT (OUTPATIENT)
Dept: ORTHOPEDIC SURGERY | Facility: CLINIC | Age: 69
End: 2024-11-26
Payer: MEDICARE

## 2024-11-26 ENCOUNTER — HOSPITAL ENCOUNTER (OUTPATIENT)
Dept: RADIOLOGY | Facility: HOSPITAL | Age: 69
Discharge: HOME | End: 2024-11-26
Payer: MEDICARE

## 2024-11-26 DIAGNOSIS — Z96.652 HISTORY OF TOTAL KNEE ARTHROPLASTY, LEFT: ICD-10-CM

## 2024-11-26 PROCEDURE — 1159F MED LIST DOCD IN RCRD: CPT

## 2024-11-26 PROCEDURE — 1160F RVW MEDS BY RX/DR IN RCRD: CPT

## 2024-11-26 PROCEDURE — 73564 X-RAY EXAM KNEE 4 OR MORE: CPT | Mod: LT

## 2024-11-26 PROCEDURE — 99024 POSTOP FOLLOW-UP VISIT: CPT

## 2024-11-26 PROCEDURE — 73564 X-RAY EXAM KNEE 4 OR MORE: CPT | Mod: LEFT SIDE | Performed by: RADIOLOGY

## 2024-11-26 ASSESSMENT — PAIN - FUNCTIONAL ASSESSMENT: PAIN_FUNCTIONAL_ASSESSMENT: 0-10

## 2024-11-26 ASSESSMENT — PAIN DESCRIPTION - DESCRIPTORS: DESCRIPTORS: ACHING

## 2024-11-26 NOTE — PROGRESS NOTES
Chief Complaint   Patient presents with    Left Knee - Post-op     10/07/24 LT TKA           This is a 68 y.o. male who is 7 weeks out from left total knee.  No drainage from his incision no fevers or chills.  Progressing well with physical therapy and appropriately improving in function.  Patient states he is doing well and is not using any assistive devices for walking but says he has constant pain in his medial knee.  He says this started around 4 weeks postop which is close to around start of his outpatient physical therapy.  Patient states he is a daily smoker still.  No other new issues or symptoms.    Left knee examination: Surgical incision well healed no erythema no drainage.  Stable to varus valgus stress range of motion 10 to 90 degrees extension flexion.  Neurovascular tact distally    X-rays of the knee were reviewed independently interpreted by me today, the show stable total knee arthroplasty no fracture dislocation or loosening    Impression plan: 68 y.o. male 6 weeks out from left total knee.  We discussed continuing physical therapy and home exercise program. Pain medications to be used as needed. Assistive devices as needed per PT. We discussed DVT prophylaxis until 6 weeks. No dentist until 3 months and thereafter dental prophylaxis for life. Activity as tolerated weightbearing as tolerated. I have personally reviewed the OARRS report for the patient. This report is scanned into the electronic medical record. I have considered the risks of abuse, dependence, addiction and diversion. Follow up 3 months with xrays.  I educated the patient the extreme importance of stopping smoking to lead to quicker recovery.  I told the patient the delay in his recovery is likely due to to his daily smoking habits.  He needs to do his best to stop smoking and to not  any other devices like drinking.  He seems to be doing well with his exercises and staying active.

## 2025-02-04 ENCOUNTER — APPOINTMENT (OUTPATIENT)
Dept: ORTHOPEDIC SURGERY | Facility: CLINIC | Age: 70
End: 2025-02-04
Payer: MEDICARE

## 2025-02-04 ENCOUNTER — HOSPITAL ENCOUNTER (OUTPATIENT)
Dept: RADIOLOGY | Facility: HOSPITAL | Age: 70
Discharge: HOME | End: 2025-02-04
Payer: MEDICARE

## 2025-02-04 DIAGNOSIS — Z96.652 HISTORY OF TOTAL KNEE ARTHROPLASTY, LEFT: ICD-10-CM

## 2025-02-04 PROCEDURE — 1125F AMNT PAIN NOTED PAIN PRSNT: CPT

## 2025-02-04 PROCEDURE — 73564 X-RAY EXAM KNEE 4 OR MORE: CPT | Mod: LEFT SIDE | Performed by: RADIOLOGY

## 2025-02-04 PROCEDURE — 73564 X-RAY EXAM KNEE 4 OR MORE: CPT | Mod: LT

## 2025-02-04 PROCEDURE — 1159F MED LIST DOCD IN RCRD: CPT

## 2025-02-04 PROCEDURE — 99214 OFFICE O/P EST MOD 30 MIN: CPT

## 2025-02-04 PROCEDURE — 1160F RVW MEDS BY RX/DR IN RCRD: CPT

## 2025-02-04 PROCEDURE — 73562 X-RAY EXAM OF KNEE 3: CPT | Mod: RT

## 2025-02-04 ASSESSMENT — PAIN SCALES - GENERAL: PAINLEVEL_OUTOF10: 1

## 2025-02-04 ASSESSMENT — PAIN - FUNCTIONAL ASSESSMENT: PAIN_FUNCTIONAL_ASSESSMENT: 0-10

## 2025-02-04 NOTE — PROGRESS NOTES
This is a consultation from Dr. Jena Mireles, APRN-CNP for   Chief Complaint   Patient presents with    Left Knee - Post-op     10/07/24 LT TKA       This is a 69 y.o. male who presents for follow-up of his left total knee replacement that was about 4 months ago.  Patient states that he is doing much better now that more time is passed by and is more active around his house and garage.  Patient states that he is having some pain in his right knee more so on the medial side and will think about when it is time to return for a knee injection on that side.  Patient states he is not using a cane or a walker for ambulation and feels comfortable without much pain.  He says that when he is more active he is sore but goes away.  Patient denies any numbness or tingling in his distal extremities patient denies any fevers chills.  Patient is overall very pleased with surgery.    Physical Exam    There has been no interval change in this patient's past medical, surgical, medications, allergies, family history or social history since the most recent visit to a provider within our department. 14 point review of systems was performed, reviewed, and negative except for pertinent positives documented in the history of present illness.     Constitutional: well developed, well nourished male in no acute distress  Psychiatric: normal mood, appropriate affect  Eyes: sclera anicteric  HENT: normocephalic/atraumatic  CV: regular rate and rhythm   Respiratory: non labored breathing  Integumentary: no rash  Neurological: moves all extremities    Right knee exam: skin intact no lacerations or abrations. no effusion.  Tender medial joint line. negative log roll negative patellar grind. ROM 0-120. stable to varus and valgus stress at 0 and 30 degrees. negative lachman negative posterior drawer negative monisha. 5/5 ehl/fhl/gs/ta. silt s/s/sp/dp/t. 2+ dp/pt    Left knee: Incision is well-healed without any erythema or discharge.  No pain  with range of motion.  Neurovascularly intact distally.        Xrays were ordered by me, they were reviewed and independently interpreted by me today, they show stable left total knee arthroplasty without any presence of loosening fracture or dislocation    Procedures      Impression/Plan: This is a 69 y.o. male 4 months out from his left total knee replacement.  I told him to continue with physical therapy and exercises at home and to stay active as much as he can.  Told him that if his right knee is bothering him he is able to come back in for a shot whenever he would like.    BMI Readings from Last 1 Encounters:   10/22/24 30.38 kg/m²      Lab Results   Component Value Date    CREATININE 0.72 10/08/2024     Tobacco Use: High Risk (2/4/2025)    Patient History     Smoking Tobacco Use: Every Day     Smokeless Tobacco Use: Never     Passive Exposure: Not on file      Computed MELD 3.0 unavailable. One or more values for this score either were not found within the given timeframe or did not fit some other criterion.  Computed MELD-Na unavailable. One or more values for this score either were not found within the given timeframe or did not fit some other criterion.       Lab Results   Component Value Date    HGBA1C 5.6 02/28/2024     Lab Results   Component Value Date    STAPHMRSASCR No Staphylococcus aureus isolated 09/16/2024

## 2025-04-25 ENCOUNTER — APPOINTMENT (OUTPATIENT)
Dept: ORTHOPEDIC SURGERY | Facility: CLINIC | Age: 70
End: 2025-04-25
Payer: MEDICARE

## 2025-04-25 ENCOUNTER — HOSPITAL ENCOUNTER (OUTPATIENT)
Dept: RADIOLOGY | Facility: HOSPITAL | Age: 70
Discharge: HOME | End: 2025-04-25
Payer: MEDICARE

## 2025-04-25 VITALS — WEIGHT: 228 LBS | HEIGHT: 72 IN | BODY MASS INDEX: 30.88 KG/M2

## 2025-04-25 DIAGNOSIS — Z96.652 S/P TOTAL KNEE ARTHROPLASTY, LEFT: ICD-10-CM

## 2025-04-25 PROCEDURE — 73562 X-RAY EXAM OF KNEE 3: CPT | Mod: RT

## 2025-04-25 PROCEDURE — 73564 X-RAY EXAM KNEE 4 OR MORE: CPT | Mod: LT

## 2025-04-25 RX ORDER — TRIAMCINOLONE ACETONIDE 40 MG/ML
1 INJECTION, SUSPENSION INTRA-ARTICULAR; INTRAMUSCULAR
Status: COMPLETED | OUTPATIENT
Start: 2025-04-25 | End: 2025-04-25

## 2025-04-25 RX ADMIN — TRIAMCINOLONE ACETONIDE 1 ML: 40 INJECTION, SUSPENSION INTRA-ARTICULAR; INTRAMUSCULAR at 09:17

## 2025-04-25 ASSESSMENT — PAIN SCALES - GENERAL: PAINLEVEL_OUTOF10: 1

## 2025-04-25 ASSESSMENT — PAIN - FUNCTIONAL ASSESSMENT: PAIN_FUNCTIONAL_ASSESSMENT: 0-10

## 2025-04-25 NOTE — PROGRESS NOTES
This is a consultation from Dr. Jena Mireles, APRN-CNP for   Chief Complaint   Patient presents with    Left Knee - Follow-up, Pain     10/07/24 LT TKA PAIN   Right knee pain    This is a 69 y.o. male who presents for bilateral knees.  He is about 6 months out from left total knee, he is doing well but he does notes he is some pain on the lateral aspect of the front of his knee.  He also feels occasional clicking in the knee.  No drainage from his incision no fevers no chills.  He is back doing most activities but feels he is still somewhat limited.  No drainage from his incision no fevers no chills.  He has also had increasing right knee pain the last few weeks, exacerbation of his previous problem.  Sharp pain over the medial knee worse with walking proving with rest.  He is injections in the past.    Physical Exam    There has been no interval change in this patient's past medical, surgical, medications, allergies, family history or social history since the most recent visit to a provider within our department. 14 point review of systems was performed, reviewed, and negative except for pertinent positives documented in the history of present illness.     Constitutional: well developed, well nourished male in no acute distress  Psychiatric: normal mood, appropriate affect  Eyes: sclera anicteric  HENT: normocephalic/atraumatic  CV: regular rate and rhythm   Respiratory: non labored breathing  Integumentary: no rash  Neurological: moves all extremities    Right knee exam: skin intact no lacerations or abrations. no effusion.  Tender medial joint line. negative log roll negative patellar grind. ROM 0-120. stable to varus and valgus stress at 0 and 30 degrees. negative lachman negative posterior drawer negative monisha. 5/5 ehl/fhl/gs/ta. silt s/s/sp/dp/t. 2+ dp/pt    Left knee examination: Well-healed surgical incision erythema no drainage no pain with range of motion neurovascular intact  distally    Imaging:  Xrays were ordered by me, they were reviewed and independently interpreted by me today, they show stable left total knee arthroplasty no evidence of any fracture loosening or dislocation.  Right side shows severe degenerative disease bone-on-bone arthritis    L Inj/Asp: R knee on 4/25/2025 9:17 AM  Indications: pain and joint swelling  Details: 22 G needle, anterolateral approach  Medications: 1 mL triamcinolone acetonide 40 mg/mL    Discussion:  I discussed the conservative treatment options for knee osteoarthritis including but not limited to physical therapy, oral NSAIDS, activity and lifestyle modification, and corticosteroid injections. Pt has elected to undergo a cortisone injection today. I have explained the risk and benefits of an injection including the possibility of joint infection, bleeding, damage to cartilage, allergic reaction. Patient verbalized understanding and gave verbal consent wishes to proceed with a intra-articular cortisone injection for their knee.    Procedure:  After discussing the risk and benefits of the procedure, we proceeded with an intra-articular right knee injection. We discussed the risks and benefits and potential morbidity related to the treatment, and to the prescription medication administered in the injection    With the patient's informed verbal consent, the right knee was prepped in standard sterile fashion with Chlorhexidine. The skin was then anesthetized with ethyl chloride spray and cleaned again with Chlorhexidine. The knee was then apirated/injected with a prefilled 20-gauge syringe of 40 mg Kenalog + 4 ml Lidocaine using the lateral approach without complications.  The patient tolerated this well and felt immediate initial relief of symptoms. A bandaid was applied and the patient ambulated out of the clinic on ther own accord without difficulty. Patient was instructed to avoid physical activity for 24-48 hours to prevent the knees from  swelling and may ice the knees as tolerated. Patient should contact the office if any signs of of infection appear: redness, fever, chills, drainage, swelling or warmth to the knees.  Pt understands that the injections can be repeated no sooner than 3 months.    Procedure, treatment alternatives, risks and benefits explained, specific risks discussed. Consent was given by the patient. Immediately prior to procedure a time out was called to verify the correct patient, procedure, equipment, support staff and site/side marked as required. Patient was prepped and draped in the usual sterile fashion.             Impression/Plan: This is a 69 y.o. male status post left total knee doing well.  Weightbearing as tolerated activity as tolerated.  Discussed strategies for anterior knee pain after total knee, there is no evidence of any mechanical complication or infection.  Recommended anti-inflammatories gentle stretching and strengthening exercises.  Regarding his right knee, I had an in depth discussion with the patient regarding treatment options for arthritis and their relative risks and benefits. We reviewed surgical and nonsurgical option for treatment. Treatments include anti inflammatory medications, physical therapy, weight loss, activity modification, use of assistive devices, injection therapies. We discussed current prescriptions and risks and benefits of continuation of prescription medication as apporpriate. We discussed that arthritis is often progressive over time, an in end stage arthritis surgical interventions can be considered, including arthroplasty. All questions were answered and the patient voiced their understanding.  Will see him back as needed    BMI Readings from Last 1 Encounters:   04/25/25 30.92 kg/m²      Lab Results   Component Value Date    CREATININE 0.72 10/08/2024     Tobacco Use: High Risk (4/25/2025)    Patient History     Smoking Tobacco Use: Every Day     Smokeless Tobacco Use: Never      Passive Exposure: Not on file      Computed MELD 3.0 unavailable. One or more values for this score either were not found within the given timeframe or did not fit some other criterion.  Computed MELD-Na unavailable. One or more values for this score either were not found within the given timeframe or did not fit some other criterion.       Lab Results   Component Value Date    HGBA1C 5.6 02/28/2024     Lab Results   Component Value Date    STAPHMRSASCR No Staphylococcus aureus isolated 09/16/2024

## (undated) DEVICE — APPLICATOR, CHLORAPREP, W/ORANGE TINT, 26ML

## (undated) DEVICE — DRESSING, GAUZE, SPONGE, KERLIX, SUPER, 6 X 6.75 IN, STERILE 10PK

## (undated) DEVICE — DRAPE PACK, TOTAL KNEE, CUSTOM, GEAUGA

## (undated) DEVICE — SUTURE, VICRYL, 1, 24 IN, CTD, UNDYED

## (undated) DEVICE — KIT, MINOR, DOUBLE BASIN

## (undated) DEVICE — SYRINGE, 60 CC, LUER LOCK, MONOJECT, W/O CAP, LF

## (undated) DEVICE — IRRIGATION SYSTEM, WOUND, PULSAVAC PLUS

## (undated) DEVICE — SUTURE, QUILL, BARBED, PDO, 2, 24 X 24CM, T8 36MM TAPER POINT, 1/2 CIRCLE

## (undated) DEVICE — PAD, KNEE PATIENT, DEMAYO, DISP, STERILE

## (undated) DEVICE — SYRINGE, 30 CC, LUER LOCK

## (undated) DEVICE — NEEDLE, SPINAL, QUINCKE, 18 G X 3.5 IN, PINK HUB

## (undated) DEVICE — HOOD, SURGICAL, FLYTE, T7 PLUS, PEEL AWAY SHIELD

## (undated) DEVICE — CATHETER TRAY, SURESTEP, 14FR, PRECONNECTED DRAIN BAG

## (undated) DEVICE — BANDAGE, ELASTIC,  6 IN X 11 YDS, STERILE, LF

## (undated) DEVICE — COVER, TABLE, 44X90

## (undated) DEVICE — SUTURE, CTD, VICRYL, 2-0, UND, BR, CT-2

## (undated) DEVICE — DRAPE, SHEET, 17 X 23 IN

## (undated) DEVICE — SUTURE, V-LOC, 4-0, 23IN, P-12, 90 ABS

## (undated) DEVICE — BLADE, OSCILLATOR 19.5 X 86 X 1.27MM

## (undated) DEVICE — TIP, SUCTION, YANKAUER, FLEXIBLE

## (undated) DEVICE — BLADE, RECIPROCATOR 12.5 X 76 X 0.9MM

## (undated) DEVICE — DRESSING, MEPILEX BORDER, POST-OP AG, 4 X 10 IN

## (undated) DEVICE — SOLUTION, IRRIGATION, USP, SODIUM CHLORIDE 0.9%, 3000 ML, BAG

## (undated) DEVICE — CLOSURE SYSTEM, DERMABOND, PRINEO, 22CM, STERILE